# Patient Record
Sex: FEMALE | Race: WHITE | NOT HISPANIC OR LATINO | Employment: UNEMPLOYED | URBAN - METROPOLITAN AREA
[De-identification: names, ages, dates, MRNs, and addresses within clinical notes are randomized per-mention and may not be internally consistent; named-entity substitution may affect disease eponyms.]

---

## 2018-01-24 ENCOUNTER — CONVERSION ENCOUNTER (OUTPATIENT)
Dept: FAMILY MEDICINE CLINIC | Facility: CLINIC | Age: 38
End: 2018-01-24

## 2018-01-24 ENCOUNTER — OFFICE VISIT CONVERTED (OUTPATIENT)
Dept: FAMILY MEDICINE CLINIC | Facility: CLINIC | Age: 38
End: 2018-01-24
Attending: NURSE PRACTITIONER

## 2018-02-09 ENCOUNTER — OFFICE VISIT CONVERTED (OUTPATIENT)
Dept: FAMILY MEDICINE CLINIC | Facility: CLINIC | Age: 38
End: 2018-02-09
Attending: NURSE PRACTITIONER

## 2018-02-15 ENCOUNTER — OFFICE VISIT CONVERTED (OUTPATIENT)
Dept: UROLOGY | Facility: CLINIC | Age: 38
End: 2018-02-15
Attending: UROLOGY

## 2018-03-30 ENCOUNTER — OFFICE VISIT CONVERTED (OUTPATIENT)
Dept: UROLOGY | Facility: CLINIC | Age: 38
End: 2018-03-30
Attending: UROLOGY

## 2018-04-09 ENCOUNTER — OFFICE VISIT CONVERTED (OUTPATIENT)
Dept: SURGERY | Facility: CLINIC | Age: 38
End: 2018-04-09
Attending: PHYSICIAN ASSISTANT

## 2018-05-11 ENCOUNTER — OFFICE VISIT CONVERTED (OUTPATIENT)
Dept: FAMILY MEDICINE CLINIC | Facility: CLINIC | Age: 38
End: 2018-05-11
Attending: NURSE PRACTITIONER

## 2018-06-01 ENCOUNTER — CONVERSION ENCOUNTER (OUTPATIENT)
Dept: SURGERY | Facility: CLINIC | Age: 38
End: 2018-06-01

## 2018-06-01 ENCOUNTER — OFFICE VISIT CONVERTED (OUTPATIENT)
Dept: UROLOGY | Facility: CLINIC | Age: 38
End: 2018-06-01
Attending: UROLOGY

## 2018-07-19 ENCOUNTER — OFFICE VISIT CONVERTED (OUTPATIENT)
Dept: SURGERY | Facility: CLINIC | Age: 38
End: 2018-07-19
Attending: PHYSICIAN ASSISTANT

## 2018-10-04 ENCOUNTER — OFFICE VISIT CONVERTED (OUTPATIENT)
Dept: FAMILY MEDICINE CLINIC | Facility: CLINIC | Age: 38
End: 2018-10-04
Attending: NURSE PRACTITIONER

## 2018-12-18 ENCOUNTER — CONVERSION ENCOUNTER (OUTPATIENT)
Dept: FAMILY MEDICINE CLINIC | Facility: CLINIC | Age: 38
End: 2018-12-18

## 2018-12-18 ENCOUNTER — OFFICE VISIT CONVERTED (OUTPATIENT)
Dept: FAMILY MEDICINE CLINIC | Facility: CLINIC | Age: 38
End: 2018-12-18
Attending: NURSE PRACTITIONER

## 2019-01-08 ENCOUNTER — OFFICE VISIT CONVERTED (OUTPATIENT)
Dept: SURGERY | Facility: CLINIC | Age: 39
End: 2019-01-08
Attending: SURGERY

## 2019-01-23 ENCOUNTER — HOSPITAL ENCOUNTER (OUTPATIENT)
Dept: PREADMISSION TESTING | Facility: HOSPITAL | Age: 39
Discharge: HOME | End: 2019-01-23
Attending: SURGERY

## 2019-01-31 ENCOUNTER — HOSPITAL ENCOUNTER (OUTPATIENT)
Dept: PERIOP | Facility: HOSPITAL | Age: 39
Setting detail: HOSPITAL OUTPATIENT SURGERY
Discharge: HOME | End: 2019-01-31
Attending: SURGERY

## 2019-01-31 LAB — HCG SERPL-SCNC: NEGATIVE MMOL/L

## 2019-03-20 ENCOUNTER — CONVERSION ENCOUNTER (OUTPATIENT)
Dept: FAMILY MEDICINE CLINIC | Facility: CLINIC | Age: 39
End: 2019-03-20

## 2019-03-20 ENCOUNTER — OFFICE VISIT CONVERTED (OUTPATIENT)
Dept: FAMILY MEDICINE CLINIC | Facility: CLINIC | Age: 39
End: 2019-03-20
Attending: NURSE PRACTITIONER

## 2019-04-19 ENCOUNTER — OFFICE VISIT CONVERTED (OUTPATIENT)
Dept: FAMILY MEDICINE CLINIC | Facility: CLINIC | Age: 39
End: 2019-04-19
Attending: NURSE PRACTITIONER

## 2019-04-19 ENCOUNTER — CONVERSION ENCOUNTER (OUTPATIENT)
Dept: FAMILY MEDICINE CLINIC | Facility: CLINIC | Age: 39
End: 2019-04-19

## 2019-05-01 ENCOUNTER — HOSPITAL ENCOUNTER (OUTPATIENT)
Dept: SURGERY | Facility: CLINIC | Age: 39
Discharge: HOME | End: 2019-05-01
Attending: UROLOGY

## 2019-05-01 ENCOUNTER — OFFICE VISIT CONVERTED (OUTPATIENT)
Dept: UROLOGY | Facility: CLINIC | Age: 39
End: 2019-05-01
Attending: UROLOGY

## 2019-05-01 ENCOUNTER — CONVERSION ENCOUNTER (OUTPATIENT)
Dept: SURGERY | Facility: CLINIC | Age: 39
End: 2019-05-01

## 2019-05-03 LAB — BACTERIA UR CULT: NORMAL

## 2019-05-07 ENCOUNTER — HOSPITAL ENCOUNTER (OUTPATIENT)
Dept: PERIOP | Facility: HOSPITAL | Age: 39
Setting detail: HOSPITAL OUTPATIENT SURGERY
Discharge: HOME | End: 2019-05-07
Attending: UROLOGY

## 2019-06-27 ENCOUNTER — HOSPITAL ENCOUNTER (OUTPATIENT)
Dept: ULTRASOUND IMAGING | Facility: HOSPITAL | Age: 39
Discharge: HOME | End: 2019-06-27
Attending: UROLOGY

## 2019-07-31 ENCOUNTER — OFFICE VISIT CONVERTED (OUTPATIENT)
Dept: UROLOGY | Facility: CLINIC | Age: 39
End: 2019-07-31
Attending: UROLOGY

## 2019-08-01 ENCOUNTER — HOSPITAL ENCOUNTER (OUTPATIENT)
Dept: PERIOP | Facility: HOSPITAL | Age: 39
Setting detail: HOSPITAL OUTPATIENT SURGERY
Discharge: HOME | End: 2019-08-01
Attending: UROLOGY

## 2019-08-01 LAB — HCG UR QL: NEGATIVE

## 2019-08-12 ENCOUNTER — OFFICE VISIT CONVERTED (OUTPATIENT)
Dept: FAMILY MEDICINE CLINIC | Facility: CLINIC | Age: 39
End: 2019-08-12
Attending: NURSE PRACTITIONER

## 2019-09-12 ENCOUNTER — CONVERSION ENCOUNTER (OUTPATIENT)
Dept: FAMILY MEDICINE CLINIC | Facility: CLINIC | Age: 39
End: 2019-09-12

## 2019-09-12 ENCOUNTER — OFFICE VISIT CONVERTED (OUTPATIENT)
Dept: FAMILY MEDICINE CLINIC | Facility: CLINIC | Age: 39
End: 2019-09-12
Attending: NURSE PRACTITIONER

## 2019-12-03 ENCOUNTER — OFFICE VISIT CONVERTED (OUTPATIENT)
Dept: FAMILY MEDICINE CLINIC | Facility: CLINIC | Age: 39
End: 2019-12-03
Attending: NURSE PRACTITIONER

## 2020-02-12 ENCOUNTER — CONVERSION ENCOUNTER (OUTPATIENT)
Dept: FAMILY MEDICINE CLINIC | Facility: CLINIC | Age: 40
End: 2020-02-12

## 2020-02-12 ENCOUNTER — OFFICE VISIT CONVERTED (OUTPATIENT)
Dept: FAMILY MEDICINE CLINIC | Facility: CLINIC | Age: 40
End: 2020-02-12
Attending: NURSE PRACTITIONER

## 2021-02-15 ENCOUNTER — OFFICE VISIT CONVERTED (OUTPATIENT)
Dept: UROLOGY | Facility: CLINIC | Age: 41
End: 2021-02-15
Attending: UROLOGY

## 2021-02-15 ENCOUNTER — CONVERSION ENCOUNTER (OUTPATIENT)
Dept: SURGERY | Facility: CLINIC | Age: 41
End: 2021-02-15

## 2021-02-15 LAB
BILIRUB UR QL STRIP: NEGATIVE
COLOR UR: YELLOW
CONV CLARITY OF URINE: CLEAR
CONV PROTEIN IN URINE BY AUTOMATED TEST STRIP: NEGATIVE
CONV UROBILINOGEN IN URINE BY AUTOMATED TEST STRIP: NORMAL
GLUCOSE UR QL: NEGATIVE
HGB UR QL STRIP: NEGATIVE
KETONES UR QL STRIP: NEGATIVE
LEUKOCYTE ESTERASE UR QL STRIP: NORMAL
NITRITE UR QL STRIP: NEGATIVE
PH UR STRIP.AUTO: 6.5 [PH]
SP GR UR: 1.02

## 2021-05-14 VITALS — BODY MASS INDEX: 29.66 KG/M2 | RESPIRATION RATE: 12 BRPM | WEIGHT: 178 LBS | HEIGHT: 65 IN

## 2021-05-14 NOTE — PROGRESS NOTES
Progress Note      Patient Name: Anni Pickard   Patient ID: [id number]   Sex: Female   YOB: 1980    Primary Care Provider: Valentin ESTES   Referring Provider: Valentin ESTES    Visit Date: February 15, 2021    Provider: Amador Hill MD   Location: [facility]   Location Address: [address] [place] [address]  [id number]   Location Phone: [phone]          Chief Complaint  · pt here for urologic issues      History Of Present Illness     40-year-old  female who follows up today for nephrolithiasis.     2/12/2021 CT abdomen/pelvis without - 1 punctate stone on the right and 1 punctate stone on the left.  Ureteral stones.    Currently is having left-sided flank pain.  No fevers or chills.  Pain is a 7/10. No n/v.  Pain is not improving.  This is been bothering her for about a month.  Pain does not change with movement.    20 surgeries for kidney stones.  She is not on any medication for kidney stone prevention at this time.    No cardiopulmonary history.  Patient has not smoked for 2-month.  Patient does not use blood thinner.     Patient has been on potassium citrate in the past for stones, not on this currently.  Patient has also been on HCTZ 12.5 mg in the past her PCP, she is been out of this for several months.     Previous    5/7/2019 left ureteroscopy with basket extraction of stone -all stones removed    4/28/2019 CT abdomen/pelvis withoutno obstructing stones.  Patient has about 8 stones in her left side several letter 3 or 4 mm.  Patient also has about 3 to 4 stones on her right side these are also about 3 to 4 mm.    5/1/18 right ureteroscopy with laser - all stones removed in the right side.  4/5/18 left ureteroscopy with laserall stones removed from the left side.  Patient removed both stents at home.    3/19/18 CT abdomen/pelvis without 3 nonobstructing stones in the left.  8 mm stone in the lower pole left kidney.  Other 2 stones are small.   "4 nonobstructing stones on the right.  Largest 5 mm.     6/16/16  CT abdomen/pelvis without contrast - done at [facility], 3 stones on the left all about 4-5 mm, 3 stones on the right all about 2-4 mm, no ureteral stones and no hydronephrosis    No urologic family history           Past Medical History  Alcoholism; Anxiety and depression; Blindness of right eye; Chemotherapy management, encounter for; Essential hypertension; GERD; Hypertension; Insomnia; Kidney calculi; Kidney Disease; Migraine headache; Reflux; Seizures         Past Surgical History  Cystoscopy and ureteroscopy with laser lithotripsy; D&C (dilatation and curettage); Eye removal; Lipoma Removal; Ureter stent placement; Ureteroscopic Stone Removal         Medication List  amitriptyline 50 mg oral tablet; amlodipine-benazepril 5-20 mg oral capsule; Cymbalta 60 mg oral capsule,delayed release(DR/EC); Estroven 155 mg oral capsule; hydrochlorothiazide 12.5 mg oral tablet; Lotrel 5-20 mg oral capsule; Requip 0.25 mg oral tablet; Seroquel 400 mg oral tablet; trazodone 100 mg oral tablet; Xanax 0.5 mg oral tablet; zolpidem 6.25 mg oral tablet,ext release multiphase         Allergy List  NO KNOWN DRUG ALLERGIES         Family Medical History  Stroke; Heart Disease; Cancer, Unspecified; Diabetes         Social History  Tobacco (Current every day)         Review of Systems  · Constitutional  o Denies  o : chills, fever  · Gastrointestinal  o Denies  o : nausea, vomiting      Vitals  Date Time BP Position Site L\R Cuff Size HR RR TEMP (F) WT  HT  BMI kg/m2 BSA m2 O2 Sat FR L/min FiO2        02/15/2021 10:24 AM       12  178lbs 0oz 5'  5\" 29.62 1.92             Physical Examination  · Constitutional  o Appearance  o : Well-appearing, well-developed, in no acute distress  · Respiratory  o Respiratory Effort  o : Unlabored breathing  · Neurologic  o Mental Status Examination  o :   § Orientation  § : Grossly oriented to person, place and time, judgment and insight " intact, normal mood and affect          Results  · In-Office Procedures  o Lab procedure  § Automated Dipstick Urinalysis (Surg Spec) WITHOUT Micro [facility] (83893)   § Color Ur: Yellow   § Clarity Ur: Clear   § Glucose Ur Ql Strip: Negative   § Bilirub Ur Ql Strip: Negative   § Ketones Ur Ql Strip: Negative   § Sp Gr Ur Qn: 1.020   § Hgb Ur Ql Strip: Negative   § pH Ur-LsCnc: 6.5   § Prot Ur Ql Strip: Negative   § Urobilinogen Ur Strip-mCnc: 0.2 E.U./dL   § Nitrite Ur Ql Strip: Negative   § WBC Est Ur Ql Strip: Trace       Assessment  · Nephrolithiasis     592.0/N20.0  · Flank pain     789.09/R10.9      Plan  · Orders  o Litholink (Urine Collection) (LITHO) - 592.0/N20.0, 789.09/R10.9 - 02/15/2021  o BMP Non-fasting [facility] (26674) - 592.0/N20.0, 789.09/R10.9 - 02/15/2021  o Uric Acid Serum [facility] (84961) - 592.0/N20.0, 789.09/R10.9 - 02/15/2021  · Medications  o Medications have been Reconciled  o Transition of Care or Provider Policy  · Instructions  o Electronically Identified Patient Education Materials Provided Electronically       CT images and read reviewed today and discussed with the patient.    Having some pain in her left side, I did discuss her CT does not show any stones of any size and no obstructing stones.  No surgical intervention needed.  Patient voiced understanding    Patient will start back on HCTZ 12.5 through PCP and get her 24 urine after     BMP and serum uric acid     interested in metabolic stone work-up, we discussed this today I will have her come back in 3 months with tests                 Electronically Signed by: Amador Hill MD -Author on February 15, 2021 01:48:13 PM

## 2021-05-15 VITALS
HEART RATE: 107 BPM | RESPIRATION RATE: 16 BRPM | HEIGHT: 65 IN | BODY MASS INDEX: 28.82 KG/M2 | DIASTOLIC BLOOD PRESSURE: 68 MMHG | OXYGEN SATURATION: 96 % | TEMPERATURE: 98.2 F | SYSTOLIC BLOOD PRESSURE: 138 MMHG | WEIGHT: 173 LBS

## 2021-05-15 VITALS
WEIGHT: 152 LBS | DIASTOLIC BLOOD PRESSURE: 110 MMHG | HEIGHT: 65 IN | OXYGEN SATURATION: 97 % | RESPIRATION RATE: 16 BRPM | HEART RATE: 113 BPM | BODY MASS INDEX: 25.33 KG/M2 | TEMPERATURE: 98.4 F | SYSTOLIC BLOOD PRESSURE: 113 MMHG

## 2021-05-15 VITALS
WEIGHT: 192 LBS | HEIGHT: 65 IN | RESPIRATION RATE: 16 BRPM | OXYGEN SATURATION: 96 % | BODY MASS INDEX: 31.99 KG/M2 | TEMPERATURE: 98 F | SYSTOLIC BLOOD PRESSURE: 116 MMHG | DIASTOLIC BLOOD PRESSURE: 72 MMHG | HEART RATE: 114 BPM

## 2021-05-15 VITALS
HEART RATE: 80 BPM | OXYGEN SATURATION: 98 % | SYSTOLIC BLOOD PRESSURE: 125 MMHG | BODY MASS INDEX: 28.86 KG/M2 | RESPIRATION RATE: 28 BRPM | WEIGHT: 173.25 LBS | DIASTOLIC BLOOD PRESSURE: 73 MMHG | TEMPERATURE: 97.8 F | HEIGHT: 65 IN

## 2021-05-15 VITALS
RESPIRATION RATE: 23 BRPM | BODY MASS INDEX: 31.4 KG/M2 | DIASTOLIC BLOOD PRESSURE: 69 MMHG | SYSTOLIC BLOOD PRESSURE: 106 MMHG | WEIGHT: 188.5 LBS | HEIGHT: 65 IN | HEART RATE: 90 BPM | TEMPERATURE: 97.7 F

## 2021-05-15 VITALS
BODY MASS INDEX: 30.66 KG/M2 | TEMPERATURE: 99.6 F | OXYGEN SATURATION: 97 % | WEIGHT: 184 LBS | HEIGHT: 65 IN | HEART RATE: 86 BPM | DIASTOLIC BLOOD PRESSURE: 68 MMHG | SYSTOLIC BLOOD PRESSURE: 118 MMHG | RESPIRATION RATE: 18 BRPM

## 2021-05-15 VITALS — WEIGHT: 175.19 LBS | HEIGHT: 65 IN | RESPIRATION RATE: 16 BRPM | BODY MASS INDEX: 29.19 KG/M2

## 2021-05-15 VITALS — BODY MASS INDEX: 29.22 KG/M2 | WEIGHT: 175.37 LBS | RESPIRATION RATE: 16 BRPM | HEIGHT: 65 IN

## 2021-05-16 VITALS
OXYGEN SATURATION: 98 % | TEMPERATURE: 97.5 F | DIASTOLIC BLOOD PRESSURE: 83 MMHG | SYSTOLIC BLOOD PRESSURE: 117 MMHG | WEIGHT: 180 LBS | HEART RATE: 79 BPM | HEIGHT: 65 IN | BODY MASS INDEX: 29.99 KG/M2

## 2021-05-16 VITALS
HEART RATE: 89 BPM | RESPIRATION RATE: 16 BRPM | OXYGEN SATURATION: 100 % | SYSTOLIC BLOOD PRESSURE: 141 MMHG | TEMPERATURE: 97.5 F | HEIGHT: 65 IN | DIASTOLIC BLOOD PRESSURE: 97 MMHG | WEIGHT: 186 LBS | BODY MASS INDEX: 30.99 KG/M2

## 2021-05-16 VITALS — WEIGHT: 184 LBS | RESPIRATION RATE: 16 BRPM | HEIGHT: 65 IN | BODY MASS INDEX: 30.66 KG/M2

## 2021-05-16 VITALS
HEIGHT: 65 IN | OXYGEN SATURATION: 98 % | HEART RATE: 107 BPM | SYSTOLIC BLOOD PRESSURE: 140 MMHG | BODY MASS INDEX: 29.66 KG/M2 | RESPIRATION RATE: 16 BRPM | WEIGHT: 178 LBS | TEMPERATURE: 97.1 F | DIASTOLIC BLOOD PRESSURE: 110 MMHG

## 2021-05-16 VITALS
TEMPERATURE: 97.1 F | RESPIRATION RATE: 16 BRPM | DIASTOLIC BLOOD PRESSURE: 93 MMHG | HEART RATE: 90 BPM | HEIGHT: 65 IN | BODY MASS INDEX: 30.66 KG/M2 | OXYGEN SATURATION: 97 % | WEIGHT: 184 LBS | SYSTOLIC BLOOD PRESSURE: 132 MMHG

## 2021-05-16 VITALS
HEIGHT: 65 IN | SYSTOLIC BLOOD PRESSURE: 136 MMHG | BODY MASS INDEX: 30.99 KG/M2 | DIASTOLIC BLOOD PRESSURE: 86 MMHG | WEIGHT: 186 LBS

## 2021-05-16 VITALS — BODY MASS INDEX: 29.57 KG/M2 | HEIGHT: 65 IN | WEIGHT: 177.5 LBS | RESPIRATION RATE: 12 BRPM

## 2021-05-16 VITALS — BODY MASS INDEX: 28.82 KG/M2 | WEIGHT: 173 LBS | RESPIRATION RATE: 18 BRPM | HEIGHT: 65 IN

## 2021-05-16 VITALS — WEIGHT: 176 LBS | HEIGHT: 65 IN | BODY MASS INDEX: 29.32 KG/M2 | RESPIRATION RATE: 14 BRPM

## 2021-05-16 VITALS — RESPIRATION RATE: 16 BRPM | HEIGHT: 65 IN | BODY MASS INDEX: 29.37 KG/M2 | WEIGHT: 176.31 LBS

## 2021-05-16 VITALS
HEART RATE: 98 BPM | RESPIRATION RATE: 16 BRPM | WEIGHT: 173 LBS | TEMPERATURE: 98.6 F | HEIGHT: 65 IN | BODY MASS INDEX: 28.82 KG/M2 | SYSTOLIC BLOOD PRESSURE: 121 MMHG | DIASTOLIC BLOOD PRESSURE: 81 MMHG | OXYGEN SATURATION: 95 %

## 2021-08-26 ENCOUNTER — TELEPHONE (OUTPATIENT)
Dept: FAMILY MEDICINE CLINIC | Facility: CLINIC | Age: 41
End: 2021-08-26

## 2021-08-26 RX ORDER — AMLODIPINE BESYLATE AND BENAZEPRIL HYDROCHLORIDE 5; 20 MG/1; MG/1
CAPSULE ORAL
COMMUNITY
Start: 2021-06-04 | End: 2021-08-26 | Stop reason: SDUPTHER

## 2021-08-26 RX ORDER — AMLODIPINE BESYLATE AND BENAZEPRIL HYDROCHLORIDE 5; 20 MG/1; MG/1
1 CAPSULE ORAL DAILY
Qty: 90 CAPSULE | Refills: 1 | Status: SHIPPED | OUTPATIENT
Start: 2021-08-26 | End: 2022-07-12 | Stop reason: SDUPTHER

## 2021-08-26 NOTE — TELEPHONE ENCOUNTER
Caller: PHILIP ZAVALA    Relationship: Spouse    Best call back number:[phone]    Medication needed:   LOTREL 50 MG      SAID BLOOD PRESSURE MEDICATION    When do you need the refill by: ASAP    Does the patient have less than a 3 day supply:  [x] Yes  [] No    What is the patient's preferred pharmacy:      [facility] [address] [place] [address] - [address] [facility] [phone] [facility] [phone]   [phone]

## 2021-09-13 DIAGNOSIS — G25.81 RLS (RESTLESS LEGS SYNDROME): Primary | ICD-10-CM

## 2021-09-13 RX ORDER — ROPINIROLE 0.25 MG/1
0.25 TABLET, FILM COATED ORAL NIGHTLY
Qty: 30 TABLET | Refills: 2 | Status: SHIPPED | OUTPATIENT
Start: 2021-09-13 | End: 2022-02-10 | Stop reason: SDUPTHER

## 2022-02-10 DIAGNOSIS — G25.81 RLS (RESTLESS LEGS SYNDROME): ICD-10-CM

## 2022-02-10 RX ORDER — ROPINIROLE 0.25 MG/1
0.25 TABLET, FILM COATED ORAL NIGHTLY
Qty: 30 TABLET | Refills: 2 | Status: SHIPPED | OUTPATIENT
Start: 2022-02-10 | End: 2022-09-26 | Stop reason: SDUPTHER

## 2022-02-22 ENCOUNTER — TELEPHONE (OUTPATIENT)
Dept: UROLOGY | Facility: CLINIC | Age: 42
End: 2022-02-22

## 2022-02-22 NOTE — TELEPHONE ENCOUNTER
"PATIENT CALLED BACK. SAID SHE RECEIVED A PHONE CALL FROM US. I ASKED HER IF SHE WAS REFERRED TO US FROM HER PCP. SHE SAID NO. SO I LOOKED HER UP AND SEEN WHERE [employer] WAS TRYING TO REACH PATIENT. I READ PATIENT THE MESSAGE AND WHEN I GOT TO TO WHERE IT WAS ASKING IF TESS SENT HER A 24 HOUR URINE KIT SHE SAID \"NO AND I'M GOING TO STOP YOU RIGHT THERE. I'M DONE SEEING YOU ALL.\" AND HUNG UP ON ME.   "

## 2022-02-22 NOTE — TELEPHONE ENCOUNTER
Left message for patient to call back.  Want to know if she wants to follow up with Dr Hill.  In February of 2021 he ordered labs for a follow up.  We have tried to reach patient to schedule follow up.  I do not want to put the orders in Epic unless patient schedules a follow up.  Stephanie should've already sent her a 24 hour urine kit.  If appt made will need Uric Acid serum, 24 hour urine, and BMP orders put in.  Original orders in [facility] dated 2-15-21.

## 2022-02-23 PROBLEM — K21.9 ESOPHAGEAL REFLUX: Status: ACTIVE | Noted: 2022-02-23

## 2022-02-23 PROBLEM — F41.9 ANXIETY AND DEPRESSION: Status: ACTIVE | Noted: 2022-02-23

## 2022-02-23 PROBLEM — G47.00 INSOMNIA: Status: ACTIVE | Noted: 2022-02-23

## 2022-02-23 PROBLEM — F10.20 ALCOHOLISM: Status: ACTIVE | Noted: 2022-02-23

## 2022-02-23 PROBLEM — H54.40 BLINDNESS OF RIGHT EYE: Status: ACTIVE | Noted: 2022-02-23

## 2022-02-23 PROBLEM — I10 ESSENTIAL HYPERTENSION: Status: ACTIVE | Noted: 2018-01-24

## 2022-02-23 PROBLEM — G43.909 MIGRAINE HEADACHE: Status: ACTIVE | Noted: 2022-02-23

## 2022-02-23 PROBLEM — Z51.11 CHEMOTHERAPY MANAGEMENT, ENCOUNTER FOR: Status: ACTIVE | Noted: 2022-02-23

## 2022-02-23 PROBLEM — N28.9 KIDNEY DISEASE: Status: ACTIVE | Noted: 2022-02-23

## 2022-02-23 PROBLEM — F32.A ANXIETY AND DEPRESSION: Status: ACTIVE | Noted: 2022-02-23

## 2022-02-23 PROBLEM — R56.9 SEIZURES (HCC): Status: ACTIVE | Noted: 2022-02-23

## 2022-03-31 RX ORDER — AMLODIPINE BESYLATE AND BENAZEPRIL HYDROCHLORIDE 5; 20 MG/1; MG/1
1 CAPSULE ORAL DAILY
Qty: 90 CAPSULE | Refills: 1 | Status: CANCELLED | OUTPATIENT
Start: 2022-03-31

## 2022-03-31 NOTE — TELEPHONE ENCOUNTER
sent a [employer] message stating the patient needs amLODIPine-benazepril (LOTREL 5-20) 5-20 MG per capsule  to be refilled. Patient does not have a verbal on file. lvm on the patients phone stating she would need an appointment for a refill due to her not being seen since 2/12/2020.    Please advise medication   Last visit:2/12/2020  Next visit:na

## 2022-05-03 DIAGNOSIS — G25.81 RLS (RESTLESS LEGS SYNDROME): ICD-10-CM

## 2022-05-03 RX ORDER — ROPINIROLE 0.25 MG/1
TABLET, FILM COATED ORAL
Qty: 30 TABLET | Refills: 2 | OUTPATIENT
Start: 2022-05-03

## 2022-07-11 RX ORDER — AMLODIPINE BESYLATE AND BENAZEPRIL HYDROCHLORIDE 5; 20 MG/1; MG/1
1 CAPSULE ORAL DAILY
Qty: 90 CAPSULE | Refills: 1 | OUTPATIENT
Start: 2022-07-11

## 2022-07-11 NOTE — TELEPHONE ENCOUNTER
Caller: Bishop April Dawn    Relationship: Self    Best call back number: [phone]    Requested Prescriptions:   Requested Prescriptions     Pending Prescriptions Disp Refills   • amLODIPine-benazepril (LOTREL 5-20) 5-20 MG per capsule 90 capsule 1     Sig: Take 1 capsule by mouth Daily.        Pharmacy where request should be sent: [id number] [place] [facility] [address] [facility] [phone] [facility] [phone]      Additional details provided by patient: HAS BEEN OUT FOR ABOUT 2 WEEKS    Does the patient have less than a 3 day supply:  [x] Yes  [] No    Meghan ALMAGUER Rep   07/11/22 08:08 EDT

## 2022-07-12 RX ORDER — AMLODIPINE BESYLATE AND BENAZEPRIL HYDROCHLORIDE 5; 20 MG/1; MG/1
1 CAPSULE ORAL DAILY
Qty: 90 CAPSULE | Refills: 1 | Status: SHIPPED | OUTPATIENT
Start: 2022-07-12 | End: 2022-07-13 | Stop reason: SDUPTHER

## 2022-07-12 NOTE — TELEPHONE ENCOUNTER
Caller: PHILIP ZAVALA    Relationship: Emergency Contact    Best call back number: [phone]     What is the medical concern/diagnosis: PROSTHETIC EYE IS BROKEN     What specialty or service is being requested: PROSTHETIC EYE     What is the provider, practice or medical service name: [facility]     What is the office location: [address] [place] [address]       What is the office phone number: [phone]    Any additional details: THE CODES TO USE FOR THE TRI CARE ARE    - 1 UNIT  - 1 UNIT

## 2022-07-12 NOTE — TELEPHONE ENCOUNTER
SHE WANT TO KNOW WHEN HER AMLODIPINE WILL BE SENT AND SHE NEEDS HER PROSTHETIC EYE REPLACED IT IS BROKEN

## 2022-07-13 RX ORDER — AMLODIPINE BESYLATE AND BENAZEPRIL HYDROCHLORIDE 5; 20 MG/1; MG/1
1 CAPSULE ORAL DAILY
Qty: 90 CAPSULE | Refills: 1 | Status: SHIPPED | OUTPATIENT
Start: 2022-07-13 | End: 2022-09-26 | Stop reason: SDUPTHER

## 2022-08-11 ENCOUNTER — TELEPHONE (OUTPATIENT)
Dept: FAMILY MEDICINE CLINIC | Facility: CLINIC | Age: 42
End: 2022-08-11

## 2022-08-23 ENCOUNTER — TELEPHONE (OUTPATIENT)
Dept: FAMILY MEDICINE CLINIC | Facility: CLINIC | Age: 42
End: 2022-08-23

## 2022-08-23 NOTE — TELEPHONE ENCOUNTER
lvmtcb   Patient cancelled their appointment for 8/23. Would patient like to reschedule.     This appointment may need to be an in office appointment. Patient has not been seen in office since 2/12/2020

## 2022-09-23 RX ORDER — OLANZAPINE 5 MG/1
5 TABLET ORAL
COMMUNITY
Start: 2022-08-09

## 2022-09-26 ENCOUNTER — TELEMEDICINE (OUTPATIENT)
Dept: FAMILY MEDICINE CLINIC | Facility: CLINIC | Age: 42
End: 2022-09-26

## 2022-09-26 DIAGNOSIS — R10.9 FLANK PAIN: Primary | ICD-10-CM

## 2022-09-26 DIAGNOSIS — I10 ESSENTIAL HYPERTENSION: ICD-10-CM

## 2022-09-26 DIAGNOSIS — Z97.0 PROSTHETIC EYE GLOBE: ICD-10-CM

## 2022-09-26 DIAGNOSIS — G25.81 RLS (RESTLESS LEGS SYNDROME): ICD-10-CM

## 2022-09-26 PROCEDURE — 99214 OFFICE O/P EST MOD 30 MIN: CPT | Performed by: NURSE PRACTITIONER

## 2022-09-26 RX ORDER — AMITRIPTYLINE HYDROCHLORIDE 50 MG/1
50 TABLET, FILM COATED ORAL NIGHTLY
Qty: 90 TABLET | Refills: 1 | Status: SHIPPED | OUTPATIENT
Start: 2022-09-26 | End: 2023-03-15

## 2022-09-26 RX ORDER — HYDROCHLOROTHIAZIDE 12.5 MG/1
12.5 TABLET ORAL DAILY
COMMUNITY
End: 2022-09-26 | Stop reason: SDUPTHER

## 2022-09-26 RX ORDER — AMLODIPINE BESYLATE AND BENAZEPRIL HYDROCHLORIDE 5; 20 MG/1; MG/1
1 CAPSULE ORAL DAILY
Qty: 90 CAPSULE | Refills: 0 | Status: SHIPPED | OUTPATIENT
Start: 2022-09-26 | End: 2022-12-02 | Stop reason: ALTCHOICE

## 2022-09-26 RX ORDER — ROPINIROLE 0.25 MG/1
0.25 TABLET, FILM COATED ORAL NIGHTLY
Qty: 90 TABLET | Refills: 0 | Status: SHIPPED | OUTPATIENT
Start: 2022-09-26 | End: 2022-12-20

## 2022-09-26 RX ORDER — AMITRIPTYLINE HYDROCHLORIDE 50 MG/1
50 TABLET, FILM COATED ORAL NIGHTLY
COMMUNITY
End: 2022-09-26 | Stop reason: SDUPTHER

## 2022-09-26 RX ORDER — HYDROCHLOROTHIAZIDE 12.5 MG/1
12.5 TABLET ORAL DAILY
Qty: 90 TABLET | Refills: 1 | Status: SHIPPED | OUTPATIENT
Start: 2022-09-26 | End: 2023-03-20

## 2022-09-26 NOTE — TELEPHONE ENCOUNTER
----- Message from Anni Pickard sent at 9/26/2022  9:30 AM EDT -----  Regarding: Amytriptaline  50 mg  All prescriptions have been received at the pharmacy minus the 50 mg Amytriptaline . Is there a reason?

## 2022-09-26 NOTE — PROGRESS NOTES
Chief Complaint  Needing referral for prosthetic eye, medication refills    Subjective         April Dena Pickard presents to [facility]  Telehealth visit completed with the patient.  Consent was obtained due to the telehealth visit.  She states that she is needing referral for her prosthetic eye.  Patient has had a prosthetic eye since 1981.  Her current prosthesis was obtained 6 years ago.  She is recently misplaced this and does not have one currently.  Patient also complains of left flank pain.  He states that this is in the area where she had a lipoma removed.  She denies any urinary symptoms.  I did discuss needing to obtain lab work.  Patient adamantly refuses to have this completed.  I did discuss the importance of evaluating kidney and liver function due to her being on medications.  Patient states that she will not have lab work completed.  I did explain the risk associated with this.  Patient's  was present during this conversation.       Objective     There were no vitals filed for this visit.   There is no height or weight on file to calculate BMI.            Physical Exam  Pulmonary:      Effort: Pulmonary effort is normal.   Neurological:      General: No focal deficit present.      Mental Status: She is alert.   Psychiatric:         Mood and Affect: Mood normal.         Behavior: Behavior normal.          Result Review :   The following data was reviewed by: FRANKLYN Villalba on 09/26/2022.      Procedures    Assessment and Plan   Diagnoses and all orders for this visit:    1. Flank pain (Primary)  -     CT Abdomen Pelvis Without Contrast; Future    2. Prosthetic eye globe  -     Ambulatory Referral to Ophthalmology    3. RLS (restless legs syndrome)  -     rOPINIRole (Requip) 0.25 MG tablet; Take 1 tablet by mouth Every Night. Take 1 hour before bedtime.  Dispense: 90 tablet; Refill: 0    4. Essential hypertension  -     amLODIPine-benazepril (LOTREL 5-20)  5-20 MG per capsule; Take 1 capsule by mouth Daily.  Dispense: 90 capsule; Refill: 0  -     hydroCHLOROthiazide (HYDRODIURIL) 12.5 MG tablet; Take 1 tablet by mouth Daily.  Dispense: 90 tablet; Refill: 1          Follow Up   Return in about 6 months (around 3/26/2023) for Recheck.  Patient was given instructions and counseling regarding her condition or for health maintenance advice. Please see specific information pulled into the AVS if appropriate.

## 2022-10-13 ENCOUNTER — APPOINTMENT (OUTPATIENT)
Dept: CT IMAGING | Facility: HOSPITAL | Age: 42
End: 2022-10-13

## 2022-11-03 ENCOUNTER — HOSPITAL ENCOUNTER (OUTPATIENT)
Dept: CT IMAGING | Facility: HOSPITAL | Age: 42
Discharge: HOME | End: 2022-11-03
Admitting: NURSE PRACTITIONER

## 2022-11-03 DIAGNOSIS — R10.9 FLANK PAIN: ICD-10-CM

## 2022-11-03 PROCEDURE — 74176 CT ABD & PELVIS W/O CONTRAST: CPT

## 2022-12-02 DIAGNOSIS — I10 ESSENTIAL HYPERTENSION: ICD-10-CM

## 2022-12-02 RX ORDER — PANTOPRAZOLE SODIUM 40 MG/1
40 TABLET, DELAYED RELEASE ORAL DAILY
Qty: 90 TABLET | Refills: 1 | Status: SHIPPED | OUTPATIENT
Start: 2022-12-02 | End: 2023-02-27

## 2022-12-02 RX ORDER — AMLODIPINE BESYLATE AND BENAZEPRIL HYDROCHLORIDE 10; 20 MG/1; MG/1
1 CAPSULE ORAL DAILY
Qty: 90 CAPSULE | Refills: 1 | Status: SHIPPED | OUTPATIENT
Start: 2022-12-02 | End: 2023-02-24 | Stop reason: SDUPTHER

## 2022-12-16 DIAGNOSIS — G43.909 MIGRAINE WITHOUT STATUS MIGRAINOSUS, NOT INTRACTABLE, UNSPECIFIED MIGRAINE TYPE: Primary | ICD-10-CM

## 2022-12-20 DIAGNOSIS — I10 ESSENTIAL HYPERTENSION: ICD-10-CM

## 2022-12-20 DIAGNOSIS — G25.81 RLS (RESTLESS LEGS SYNDROME): ICD-10-CM

## 2022-12-20 RX ORDER — AMLODIPINE BESYLATE AND BENAZEPRIL HYDROCHLORIDE 5; 20 MG/1; MG/1
1 CAPSULE ORAL DAILY
Qty: 90 CAPSULE | Refills: 0 | OUTPATIENT
Start: 2022-12-20

## 2022-12-20 RX ORDER — ROPINIROLE 0.25 MG/1
TABLET, FILM COATED ORAL
Qty: 90 TABLET | Refills: 0 | Status: SHIPPED | OUTPATIENT
Start: 2022-12-20 | End: 2023-03-20

## 2022-12-27 DIAGNOSIS — Z13.220 SCREENING FOR LIPID DISORDERS: ICD-10-CM

## 2022-12-27 DIAGNOSIS — I10 ESSENTIAL HYPERTENSION: Primary | ICD-10-CM

## 2022-12-27 DIAGNOSIS — N28.9 KIDNEY DISEASE: ICD-10-CM

## 2022-12-27 DIAGNOSIS — N92.6 MENSES, IRREGULAR: ICD-10-CM

## 2023-02-13 ENCOUNTER — TELEPHONE (OUTPATIENT)
Dept: FAMILY MEDICINE CLINIC | Facility: CLINIC | Age: 43
End: 2023-02-13
Payer: OTHER GOVERNMENT

## 2023-02-13 NOTE — TELEPHONE ENCOUNTER
Caller: Anni    Relationship: Self    Best call back number: [phone]    Who are you requesting to speak with (clinical staff, provider,  specific staff member): CLINICAL    What was the call regarding: PATIENT STATES HER  HAS SIGNED HER UP FOR [employer] AND HAS BEEN SENDING MESSAGES AS HER. PATIENT STATES SHE DIDN'T EVEN KNOW THAT [employer] EXISTED.    Do you require a callback: IF NECESSARY

## 2023-02-13 NOTE — TELEPHONE ENCOUNTER
Called patient.       Patient stated she was not aware that  was sending messages as her in her personal mychart pretending to be her. Patient wants husbands phone number off her chart and he name off the emergency contact.     Advised patient if we receive a mychart message from her that we will call her on the phone to discuss.     Patient has upcoming appt and will fix her chart

## 2023-02-24 ENCOUNTER — TELEPHONE (OUTPATIENT)
Dept: FAMILY MEDICINE CLINIC | Facility: CLINIC | Age: 43
End: 2023-02-24

## 2023-02-24 RX ORDER — AMLODIPINE BESYLATE AND BENAZEPRIL HYDROCHLORIDE 10; 20 MG/1; MG/1
1 CAPSULE ORAL DAILY
Qty: 30 CAPSULE | Refills: 0 | Status: SHIPPED | OUTPATIENT
Start: 2023-02-24

## 2023-02-24 NOTE — TELEPHONE ENCOUNTER
Per [employer] message in spouse account.    Anni Hanson had an appointment with you this morning at 7:30 and decided she couldn’t keep it due to issues with diarrhea, so she couldn’t leave the house. Somehow, she deactivated her [employer] account and now states that she’s never had one.  Additionally, I tried to inform the office of this, but they stated that they couldn’t speak with me about it because I wasn’t on some form that they needed.  She has gotten her medications messed up, or lost them, but she is completely out of her amlodipine for blood pressure and it’s about a week before I can refill it. Could you send some to [facility] for her?     Respectfully,  Maynor Pickard         is no longer on verbal consent, so I cannot inform him of anything, can you refill her medication for 30 days?

## 2023-02-27 RX ORDER — PANTOPRAZOLE SODIUM 40 MG/1
40 TABLET, DELAYED RELEASE ORAL DAILY
Qty: 90 TABLET | Refills: 0 | Status: SHIPPED | OUTPATIENT
Start: 2023-02-27

## 2023-03-15 RX ORDER — AMITRIPTYLINE HYDROCHLORIDE 50 MG/1
50 TABLET, FILM COATED ORAL NIGHTLY
Qty: 90 TABLET | Refills: 1 | Status: SHIPPED | OUTPATIENT
Start: 2023-03-15

## 2023-03-18 DIAGNOSIS — G25.81 RLS (RESTLESS LEGS SYNDROME): ICD-10-CM

## 2023-03-18 DIAGNOSIS — I10 ESSENTIAL HYPERTENSION: ICD-10-CM

## 2023-03-20 RX ORDER — ROPINIROLE 0.25 MG/1
TABLET, FILM COATED ORAL
Qty: 90 TABLET | Refills: 0 | Status: SHIPPED | OUTPATIENT
Start: 2023-03-20

## 2023-03-20 RX ORDER — HYDROCHLOROTHIAZIDE 12.5 MG/1
12.5 TABLET ORAL DAILY
Qty: 90 TABLET | Refills: 0 | Status: SHIPPED | OUTPATIENT
Start: 2023-03-20

## 2023-05-24 RX ORDER — AMLODIPINE BESYLATE AND BENAZEPRIL HYDROCHLORIDE 10; 20 MG/1; MG/1
1 CAPSULE ORAL DAILY
Qty: 90 CAPSULE | OUTPATIENT
Start: 2023-05-24

## 2023-06-02 ENCOUNTER — TELEPHONE (OUTPATIENT)
Dept: FAMILY MEDICINE CLINIC | Facility: CLINIC | Age: 43
End: 2023-06-02

## 2023-06-02 NOTE — TELEPHONE ENCOUNTER
Caller: PHILIP ZAVALA    Relationship to patient: Emergency Contact    Best call back number: [phone]    Chief complaint: NEW PATIENT    Type of visit: NEW PATIENT    Requested date: ASAP     Additional notes: PATIENT'S  STATED SHE CHANGED HER PCM TO FRANKLYN SIN AND WOULD LIKE TO SET UP A NEW PATIENT APPOINTMENT. HUB WAS UNABLE TO FIND NEW PATIENT APPOINTMENT. PLEASE CALL AND ADVISE.

## 2023-06-13 RX ORDER — AMITRIPTYLINE HYDROCHLORIDE 50 MG/1
50 TABLET, FILM COATED ORAL NIGHTLY
Qty: 90 TABLET | Refills: 1 | OUTPATIENT
Start: 2023-06-13

## 2023-09-11 RX ORDER — PANTOPRAZOLE SODIUM 40 MG/1
40 TABLET, DELAYED RELEASE ORAL DAILY
Qty: 90 TABLET | Refills: 0 | OUTPATIENT
Start: 2023-09-11

## 2023-10-07 RX ORDER — AMLODIPINE BESYLATE AND BENAZEPRIL HYDROCHLORIDE 5; 20 MG/1; MG/1
1 CAPSULE ORAL DAILY
Qty: 90 CAPSULE | Refills: 1 | OUTPATIENT
Start: 2023-10-07

## 2023-11-03 ENCOUNTER — APPOINTMENT (OUTPATIENT)
Dept: CT IMAGING | Facility: HOSPITAL | Age: 43
End: 2023-11-03
Payer: OTHER GOVERNMENT

## 2023-11-03 ENCOUNTER — HOSPITAL ENCOUNTER (EMERGENCY)
Facility: HOSPITAL | Age: 43
Discharge: HOME | End: 2023-11-03
Attending: EMERGENCY MEDICINE
Payer: OTHER GOVERNMENT

## 2023-11-03 VITALS
OXYGEN SATURATION: 100 % | RESPIRATION RATE: 18 BRPM | DIASTOLIC BLOOD PRESSURE: 109 MMHG | HEIGHT: 65 IN | WEIGHT: 191.14 LBS | BODY MASS INDEX: 31.85 KG/M2 | SYSTOLIC BLOOD PRESSURE: 146 MMHG | TEMPERATURE: 98.1 F | HEART RATE: 97 BPM

## 2023-11-03 DIAGNOSIS — N39.0 URINARY TRACT INFECTION WITHOUT HEMATURIA, SITE UNSPECIFIED: Primary | ICD-10-CM

## 2023-11-03 DIAGNOSIS — R10.9 FLANK PAIN: ICD-10-CM

## 2023-11-03 DIAGNOSIS — Z76.0 MEDICATION REFILL: ICD-10-CM

## 2023-11-03 LAB
ALBUMIN SERPL-MCNC: 4.2 G/DL (ref 3.5–5.2)
ALBUMIN/GLOB SERPL: 1.4 G/DL
ALP SERPL-CCNC: 127 U/L (ref 39–117)
ALT SERPL W P-5'-P-CCNC: 23 U/L (ref 1–33)
ANION GAP SERPL CALCULATED.3IONS-SCNC: 11.7 MMOL/L (ref 5–15)
AST SERPL-CCNC: 18 U/L (ref 1–32)
BACTERIA UR QL AUTO: ABNORMAL /HPF
BASOPHILS # BLD AUTO: 0.15 10*3/MM3 (ref 0–0.2)
BASOPHILS NFR BLD AUTO: 1.2 % (ref 0–1.5)
BILIRUB SERPL-MCNC: 0.2 MG/DL (ref 0–1.2)
BILIRUB UR QL STRIP: NEGATIVE
BUN SERPL-MCNC: 8 MG/DL (ref 6–20)
BUN/CREAT SERPL: 11.3 (ref 7–25)
CALCIUM SPEC-SCNC: 9.9 MG/DL (ref 8.6–10.5)
CHLORIDE SERPL-SCNC: 103 MMOL/L (ref 98–107)
CLARITY UR: CLEAR
CO2 SERPL-SCNC: 23.3 MMOL/L (ref 22–29)
COLOR UR: YELLOW
CREAT SERPL-MCNC: 0.71 MG/DL (ref 0.57–1)
DEPRECATED RDW RBC AUTO: 46.1 FL (ref 37–54)
EGFRCR SERPLBLD CKD-EPI 2021: 108.3 ML/MIN/1.73
EOSINOPHIL # BLD AUTO: 0.27 10*3/MM3 (ref 0–0.4)
EOSINOPHIL NFR BLD AUTO: 2.2 % (ref 0.3–6.2)
ERYTHROCYTE [DISTWIDTH] IN BLOOD BY AUTOMATED COUNT: 14 % (ref 12.3–15.4)
FLUAV SUBTYP SPEC NAA+PROBE: NOT DETECTED
FLUBV RNA ISLT QL NAA+PROBE: NOT DETECTED
GLOBULIN UR ELPH-MCNC: 2.9 GM/DL
GLUCOSE SERPL-MCNC: 128 MG/DL (ref 65–99)
GLUCOSE UR STRIP-MCNC: NEGATIVE MG/DL
HCG INTACT+B SERPL-ACNC: <0.5 MIU/ML
HCT VFR BLD AUTO: 44.7 % (ref 34–46.6)
HGB BLD-MCNC: 15.2 G/DL (ref 12–15.9)
HGB UR QL STRIP.AUTO: NEGATIVE
HOLD SPECIMEN: NORMAL
HOLD SPECIMEN: NORMAL
HYALINE CASTS UR QL AUTO: ABNORMAL /LPF
IMM GRANULOCYTES # BLD AUTO: 0.03 10*3/MM3 (ref 0–0.05)
IMM GRANULOCYTES NFR BLD AUTO: 0.2 % (ref 0–0.5)
KETONES UR QL STRIP: NEGATIVE
LEUKOCYTE ESTERASE UR QL STRIP.AUTO: ABNORMAL
LIPASE SERPL-CCNC: 34 U/L (ref 13–60)
LYMPHOCYTES # BLD AUTO: 4.47 10*3/MM3 (ref 0.7–3.1)
LYMPHOCYTES NFR BLD AUTO: 35.8 % (ref 19.6–45.3)
MCH RBC QN AUTO: 30.6 PG (ref 26.6–33)
MCHC RBC AUTO-ENTMCNC: 34 G/DL (ref 31.5–35.7)
MCV RBC AUTO: 89.9 FL (ref 79–97)
MONOCYTES # BLD AUTO: 0.86 10*3/MM3 (ref 0.1–0.9)
MONOCYTES NFR BLD AUTO: 6.9 % (ref 5–12)
NEUTROPHILS NFR BLD AUTO: 53.7 % (ref 42.7–76)
NEUTROPHILS NFR BLD AUTO: 6.7 10*3/MM3 (ref 1.7–7)
NITRITE UR QL STRIP: NEGATIVE
NRBC BLD AUTO-RTO: 0 /100 WBC (ref 0–0.2)
PH UR STRIP.AUTO: 7 [PH] (ref 5–8)
PLATELET # BLD AUTO: 476 10*3/MM3 (ref 140–450)
PMV BLD AUTO: 9.2 FL (ref 6–12)
POTASSIUM SERPL-SCNC: 3.9 MMOL/L (ref 3.5–5.2)
PROT SERPL-MCNC: 7.1 G/DL (ref 6–8.5)
PROT UR QL STRIP: NEGATIVE
RBC # BLD AUTO: 4.97 10*6/MM3 (ref 3.77–5.28)
RBC # UR STRIP: ABNORMAL /HPF
REF LAB TEST METHOD: ABNORMAL
RSV RNA NPH QL NAA+NON-PROBE: NOT DETECTED
SARS-COV-2 RNA RESP QL NAA+PROBE: NOT DETECTED
SODIUM SERPL-SCNC: 138 MMOL/L (ref 136–145)
SP GR UR STRIP: 1.01 (ref 1–1.03)
SQUAMOUS #/AREA URNS HPF: ABNORMAL /HPF
UROBILINOGEN UR QL STRIP: ABNORMAL
WBC # UR STRIP: ABNORMAL /HPF
WBC NRBC COR # BLD: 12.48 10*3/MM3 (ref 3.4–10.8)
WHOLE BLOOD HOLD COAG: NORMAL
WHOLE BLOOD HOLD SPECIMEN: NORMAL

## 2023-11-03 PROCEDURE — 25010000002 HYDROMORPHONE 1 MG/ML SOLUTION: Performed by: EMERGENCY MEDICINE

## 2023-11-03 PROCEDURE — 96361 HYDRATE IV INFUSION ADD-ON: CPT

## 2023-11-03 PROCEDURE — 25010000002 CEFTRIAXONE PER 250 MG: Performed by: EMERGENCY MEDICINE

## 2023-11-03 PROCEDURE — 87086 URINE CULTURE/COLONY COUNT: CPT | Performed by: EMERGENCY MEDICINE

## 2023-11-03 PROCEDURE — 25510000001 IOPAMIDOL PER 1 ML: Performed by: EMERGENCY MEDICINE

## 2023-11-03 PROCEDURE — 25010000002 KETOROLAC TROMETHAMINE PER 15 MG: Performed by: NURSE PRACTITIONER

## 2023-11-03 PROCEDURE — 74177 CT ABD & PELVIS W/CONTRAST: CPT

## 2023-11-03 PROCEDURE — 25810000003 SODIUM CHLORIDE 0.9 % SOLUTION: Performed by: NURSE PRACTITIONER

## 2023-11-03 PROCEDURE — 80053 COMPREHEN METABOLIC PANEL: CPT | Performed by: NURSE PRACTITIONER

## 2023-11-03 PROCEDURE — 83690 ASSAY OF LIPASE: CPT | Performed by: NURSE PRACTITIONER

## 2023-11-03 PROCEDURE — 99285 EMERGENCY DEPT VISIT HI MDM: CPT

## 2023-11-03 PROCEDURE — 84702 CHORIONIC GONADOTROPIN TEST: CPT | Performed by: NURSE PRACTITIONER

## 2023-11-03 PROCEDURE — 87637 SARSCOV2&INF A&B&RSV AMP PRB: CPT | Performed by: EMERGENCY MEDICINE

## 2023-11-03 PROCEDURE — 81001 URINALYSIS AUTO W/SCOPE: CPT | Performed by: NURSE PRACTITIONER

## 2023-11-03 PROCEDURE — 96375 TX/PRO/DX INJ NEW DRUG ADDON: CPT

## 2023-11-03 PROCEDURE — 96365 THER/PROPH/DIAG IV INF INIT: CPT

## 2023-11-03 PROCEDURE — 25010000002 ONDANSETRON PER 1 MG: Performed by: EMERGENCY MEDICINE

## 2023-11-03 PROCEDURE — 85025 COMPLETE CBC W/AUTO DIFF WBC: CPT | Performed by: NURSE PRACTITIONER

## 2023-11-03 RX ORDER — KETOROLAC TROMETHAMINE 15 MG/ML
30 INJECTION, SOLUTION INTRAMUSCULAR; INTRAVENOUS ONCE
Status: COMPLETED | OUTPATIENT
Start: 2023-11-03 | End: 2023-11-03

## 2023-11-03 RX ORDER — HYDROCHLOROTHIAZIDE 12.5 MG/1
12.5 TABLET ORAL DAILY
Qty: 30 TABLET | Refills: 0 | Status: SHIPPED | OUTPATIENT
Start: 2023-11-03 | End: 2023-12-03

## 2023-11-03 RX ORDER — IBUPROFEN 600 MG/1
600 TABLET ORAL EVERY 8 HOURS PRN
Qty: 21 TABLET | Refills: 0 | Status: SHIPPED | OUTPATIENT
Start: 2023-11-03 | End: 2023-11-10

## 2023-11-03 RX ORDER — SODIUM CHLORIDE 0.9 % (FLUSH) 0.9 %
10 SYRINGE (ML) INJECTION AS NEEDED
Status: DISCONTINUED | OUTPATIENT
Start: 2023-11-03 | End: 2023-11-03 | Stop reason: HOSPADM

## 2023-11-03 RX ORDER — CEFTRIAXONE SODIUM 1 G/50ML
1000 INJECTION, SOLUTION INTRAVENOUS ONCE
Status: COMPLETED | OUTPATIENT
Start: 2023-11-03 | End: 2023-11-03

## 2023-11-03 RX ORDER — KETOROLAC TROMETHAMINE 15 MG/ML
30 INJECTION, SOLUTION INTRAMUSCULAR; INTRAVENOUS ONCE
Status: DISCONTINUED | OUTPATIENT
Start: 2023-11-03 | End: 2023-11-03 | Stop reason: SDUPTHER

## 2023-11-03 RX ORDER — AMLODIPINE BESYLATE AND BENAZEPRIL HYDROCHLORIDE 5; 20 MG/1; MG/1
1 CAPSULE ORAL DAILY
COMMUNITY
Start: 2023-07-12 | End: 2023-11-03

## 2023-11-03 RX ORDER — ONDANSETRON 2 MG/ML
4 INJECTION INTRAMUSCULAR; INTRAVENOUS ONCE
Status: COMPLETED | OUTPATIENT
Start: 2023-11-03 | End: 2023-11-03

## 2023-11-03 RX ORDER — AMLODIPINE BESYLATE AND BENAZEPRIL HYDROCHLORIDE 5; 20 MG/1; MG/1
1 CAPSULE ORAL DAILY
Qty: 30 CAPSULE | Refills: 0 | Status: SHIPPED | OUTPATIENT
Start: 2023-11-03 | End: 2023-12-03

## 2023-11-03 RX ORDER — CEPHALEXIN 500 MG/1
500 CAPSULE ORAL 4 TIMES DAILY
Qty: 28 CAPSULE | Refills: 0 | Status: SHIPPED | OUTPATIENT
Start: 2023-11-03 | End: 2023-11-10

## 2023-11-03 RX ADMIN — IOPAMIDOL 100 ML: 755 INJECTION, SOLUTION INTRAVENOUS at 07:32

## 2023-11-03 RX ADMIN — ONDANSETRON 4 MG: 2 INJECTION INTRAMUSCULAR; INTRAVENOUS at 06:13

## 2023-11-03 RX ADMIN — HYDROMORPHONE HYDROCHLORIDE 1 MG: 1 INJECTION, SOLUTION INTRAMUSCULAR; INTRAVENOUS; SUBCUTANEOUS at 06:49

## 2023-11-03 RX ADMIN — CEFTRIAXONE SODIUM 1000 MG: 1 INJECTION, SOLUTION INTRAVENOUS at 07:48

## 2023-11-03 RX ADMIN — SODIUM CHLORIDE 1000 ML: 9 INJECTION, SOLUTION INTRAVENOUS at 05:56

## 2023-11-03 RX ADMIN — KETOROLAC TROMETHAMINE 30 MG: 15 INJECTION, SOLUTION INTRAMUSCULAR; INTRAVENOUS at 05:57

## 2023-11-03 NOTE — DISCHARGE INSTRUCTIONS
Please follow-up with your doctor in 48 hours to review the urine culture that was performed today to determine if the proper antibiotics were prescribed    Please follow-up with your doctor Monday for serial reexamination of the abdomen and flank.  Return to the emergency room immediately for intractable pain, intractable vomiting, fever, shaking chills, muscle aches, near passing out, passing out or any new symptoms you are concerned with

## 2023-11-03 NOTE — ED TRIAGE NOTES
Patient has pain in left flank and middle back. Patient has history of kidney stones. Pain has been constantly worsening for 3 days.

## 2023-11-03 NOTE — ED PROVIDER NOTES
Time: 5:28 AM EDT  Date of encounter:  11/3/2023  Independent Historian/Clinical History and Information was obtained by:   Patient    History is limited by: N/A    Chief Complaint: Left flank pain      History of Present Illness:  Patient is a 43 y.o. year old female who presents to the emergency department for evaluation of left flank pain.  Patient is not sure when her left flank pain started.  She states that she has had kidney stones for years.  She states she always has pain.  It is worse this morning.  The patient has nausea but no vomiting.  The patient states that she had a fever 101 2 days ago.  She denies any rigors.  The patient denies any urinary frequency, urgency or dysuria.  Patient states that she has had no back pain.  The patient denies any abdominal pain.  She has had no hematochezia melena or diarrhea.  The patient has no urinary frequency urgency or dysuria.  Patient also notes that she fired her physician.  She is requesting refills on her blood pressure medication.    HPI    Patient Care Team  Primary Care Provider: Provider, No Known    Past Medical History:     No Known Allergies  Past Medical History:   Diagnosis Date    Alcoholism     Anxiety     Depression     GERD (gastroesophageal reflux disease)     Hypertension     Kidney disease     Legally blind in right eye, as defined in [place]     Seizures      Past Surgical History:   Procedure Laterality Date    CYSTOSCOPY W/ URETEROSCOPY      EYE ENUCLEATION Right     LIPOMA EXCISION      URETERAL STENT INSERTION      URETEROSCOPY       Family History   Problem Relation Age of Onset    Stroke Mother     Cancer Mother     Ovarian cancer Mother     Multiple sclerosis Father     Cancer Maternal Grandmother     Heart attack Maternal Grandmother     Cancer Paternal Grandmother        Home Medications:  Prior to Admission medications    Medication Sig Start Date End Date Taking? Authorizing Provider   ALPRAZolam (XANAX) 1 MG tablet Take 1 tablet by  mouth 2 (Two) Times a Day As Needed. 7/6/22   Chapis Watkins MD   amitriptyline (ELAVIL) 50 MG tablet TAKE 1 TABLET BY MOUTH EVERY NIGHT 3/15/23   Valentin Ward APRN   amLODIPine-benazepril (Lotrel) 10-20 MG per capsule Take 1 capsule by mouth Daily. 2/24/23   Valentin Ward APRN   gabapentin (NEURONTIN) 300 MG capsule Take 1 capsule by mouth 3 (Three) Times a Day. 7/6/22   Chapis Watkins MD   hydroCHLOROthiazide (HYDRODIURIL) 12.5 MG tablet TAKE 1 TABLET BY MOUTH DAILY 3/20/23   Valentin Ward APRN   OLANZapine (zyPREXA) 5 MG tablet Take 1 tablet by mouth every night at bedtime. 8/9/22   Chapis Watkins MD   pantoprazole (PROTONIX) 40 MG EC tablet TAKE 1 TABLET BY MOUTH DAILY 2/27/23   Valentin Ward APRN   rOPINIRole (REQUIP) 0.25 MG tablet TAKE 1 TABLET BY MOUTH EVERY NIGHT 1 HOUR BEFORE BEDTIME 3/20/23   Valentin Ward APRN   zolpidem CR (AMBIEN CR) 6.25 MG CR tablet TAKE 1 TABLET BY MOUTH EVERY NIGHT AS NEEDED 7/6/22   Chapis Watkins MD        Social History:   Social History     Tobacco Use    Smoking status: Every Day     Packs/day: 1.00     Years: 24.00     Additional pack years: 0.00     Total pack years: 24.00     Types: Cigarettes    Smokeless tobacco: Never   Vaping Use    Vaping Use: Never used   Substance Use Topics    Alcohol use: Not Currently    Drug use: Never         Review of Systems:  Review of Systems   Constitutional:  Positive for fever. Negative for chills and diaphoresis.   HENT:  Negative for congestion, postnasal drip, rhinorrhea and sore throat.    Eyes:  Negative for photophobia.   Respiratory:  Negative for cough, chest tightness and shortness of breath.    Cardiovascular:  Negative for chest pain, palpitations and leg swelling.   Gastrointestinal:  Positive for nausea. Negative for abdominal pain, diarrhea and vomiting.   Genitourinary:  Positive for flank pain. Negative for difficulty urinating, dysuria, frequency, hematuria and urgency.   Musculoskeletal:   "Negative for neck pain and neck stiffness.   Skin:  Negative for pallor and rash.   Neurological:  Negative for dizziness, syncope, weakness, numbness and headaches.   Hematological:  Negative for adenopathy. Does not bruise/bleed easily.   Psychiatric/Behavioral: Negative.          Physical Exam:  BP (!) 146/109 (Patient Position: Sitting)   Pulse 97   Temp 98.1 °F (36.7 °C) (Oral)   Resp 18   Ht 165.1 cm (65\")   Wt 86.7 kg (191 lb 2.2 oz)   LMP 10/27/2023   SpO2 100%   BMI 31.81 kg/m²     Physical Exam  Vitals and nursing note reviewed.   Constitutional:       General: She is not in acute distress.     Appearance: Normal appearance. She is not ill-appearing, toxic-appearing or diaphoretic.   HENT:      Head: Normocephalic and atraumatic.      Mouth/Throat:      Mouth: Mucous membranes are moist.   Eyes:      Pupils: Pupils are equal, round, and reactive to light.   Cardiovascular:      Rate and Rhythm: Normal rate and regular rhythm.      Pulses: Normal pulses.           Carotid pulses are 2+ on the right side and 2+ on the left side.       Radial pulses are 2+ on the right side and 2+ on the left side.        Femoral pulses are 2+ on the right side and 2+ on the left side.       Popliteal pulses are 2+ on the right side and 2+ on the left side.        Dorsalis pedis pulses are 2+ on the right side and 2+ on the left side.        Posterior tibial pulses are 2+ on the right side and 2+ on the left side.      Heart sounds: Normal heart sounds. No murmur heard.  Pulmonary:      Effort: Pulmonary effort is normal. No accessory muscle usage, respiratory distress or retractions.      Breath sounds: Normal breath sounds. No wheezing, rhonchi or rales.   Abdominal:      General: Abdomen is flat. There is no distension.      Palpations: Abdomen is soft. There is no mass or pulsatile mass.      Tenderness: There is no abdominal tenderness. There is left CVA tenderness. There is no right CVA tenderness, guarding or " rebound.      Comments: No rigidity   Musculoskeletal:         General: No swelling, tenderness or deformity.      Cervical back: Neck supple. No tenderness.      Thoracic back: Spasms and tenderness present. No deformity, signs of trauma, lacerations or bony tenderness. Decreased range of motion.      Lumbar back: No tenderness or bony tenderness. Decreased range of motion.        Back:       Right lower leg: No edema.      Left lower leg: No edema.   Skin:     General: Skin is warm and dry.      Capillary Refill: Capillary refill takes less than 2 seconds.      Coloration: Skin is not jaundiced or pale.      Findings: No erythema.   Neurological:      General: No focal deficit present.      Mental Status: She is alert and oriented to person, place, and time. Mental status is at baseline.      Cranial Nerves: Cranial nerves 2-12 are intact. No cranial nerve deficit.      Sensory: Sensation is intact. No sensory deficit.      Motor: Motor function is intact. No weakness or pronator drift.      Coordination: Coordination is intact. Coordination normal.   Psychiatric:         Mood and Affect: Mood normal.         Behavior: Behavior normal.                Procedures:  Procedures      Medical Decision Making:      Comorbidities that affect care:    Anxiety, alcoholism, hypertension, GERD, frequent kidney stones, seizures    External Notes reviewed:    None      The following orders were placed and all results were independently analyzed by me:  Orders Placed This Encounter   Procedures    COVID-19, FLU A/B, RSV PCR - Swab, Nasopharynx    Urine Culture - Urine, Urine, Clean Catch    CT Abdomen Pelvis With Contrast    [place] Draw    Comprehensive Metabolic Panel    Lipase    Urinalysis With Microscopic If Indicated (No Culture) - Urine, Clean Catch    hCG, Quantitative, Pregnancy    CBC Auto Differential    Urinalysis, Microscopic Only - Urine, Clean Catch    Insert Peripheral IV    Insert peripheral IV    CBC &  Differential    Green Top (Gel)    Lavender Top    Gold Top - SST    Light Blue Top       Medications Given in the Emergency Department:  Medications   sodium chloride 0.9 % flush 10 mL (has no administration in time range)   sodium chloride 0.9 % flush 10 mL (has no administration in time range)   ketorolac (TORADOL) injection 30 mg (30 mg Intravenous Given 11/3/23 0557)   sodium chloride 0.9 % bolus 1,000 mL (0 mL Intravenous Stopped 11/3/23 0653)   HYDROmorphone (DILAUDID) injection 1 mg (1 mg Intravenous Given 11/3/23 0649)   ondansetron (ZOFRAN) injection 4 mg (4 mg Intravenous Given 11/3/23 0613)   iopamidol (ISOVUE-370) 76 % injection 100 mL (100 mL Intravenous Given 11/3/23 0732)   cefTRIAXone (ROCEPHIN) IVPB 1,000 mg (1,000 mg Intravenous New Bag 11/3/23 0748)        ED Course:    ED Course as of 11/03/23 0818 Fri Nov 03, 2023   0814 Potassium: 3.9 [SD]      ED Course User Index  [SD] Jimmy Louise DO       Labs:    Lab Results (last 24 hours)       Procedure Component Value Units Date/Time    CBC & Differential [[id number]]  (Abnormal) Collected: 11/03/23 0555    Specimen: Blood Updated: 11/03/23 0627    Narrative:      The following orders were created for panel order CBC & Differential.  Procedure                               Abnormality         Status                     ---------                               -----------         ------                     CBC Auto Differential[[id number]]        Abnormal            Final result                 Please view results for these tests on the individual orders.    Comprehensive Metabolic Panel [[id number]]  (Abnormal) Collected: 11/03/23 0555    Specimen: Blood Updated: 11/03/23 0644     Glucose 128 mg/dL      BUN 8 mg/dL      Creatinine 0.71 mg/dL      Sodium 138 mmol/L      Potassium 3.9 mmol/L      Comment: Slight hemolysis detected by analyzer. Results may be affected.        Chloride 103 mmol/L      CO2 23.3 mmol/L      Calcium 9.9 mg/dL      Total  Protein 7.1 g/dL      Albumin 4.2 g/dL      ALT (SGPT) 23 U/L      AST (SGOT) 18 U/L      Alkaline Phosphatase 127 U/L      Total Bilirubin 0.2 mg/dL      Globulin 2.9 gm/dL      A/G Ratio 1.4 g/dL      BUN/Creatinine Ratio 11.3     Anion Gap 11.7 mmol/L      eGFR 108.3 mL/min/1.73     Narrative:      GFR Normal >60  Chronic Kidney Disease <60  Kidney Failure <15      Lipase [[id number]]  (Normal) Collected: 11/03/23 0555    Specimen: Blood Updated: 11/03/23 0644     Lipase 34 U/L     Urinalysis With Microscopic If Indicated (No Culture) - Urine, Clean Catch [[id number]]  (Abnormal) Collected: 11/03/23 0555    Specimen: Urine, Clean Catch Updated: 11/03/23 0717     Color, UA Yellow     Appearance, UA Clear     pH, UA 7.0     Specific Gravity, UA 1.009     Glucose, UA Negative     Ketones, UA Negative     Bilirubin, UA Negative     Blood, UA Negative     Protein, UA Negative     Leuk Esterase, UA Moderate (2+)     Nitrite, UA Negative     Urobilinogen, UA 0.2 E.U./dL    hCG, Quantitative, Pregnancy [[id number]] Collected: 11/03/23 0555    Specimen: Blood Updated: 11/03/23 0642     HCG Quantitative <0.50 mIU/mL     Narrative:      HCG Ranges by Gestational Age    Females - non-pregnant premenopausal   </= 1mIU/mL HCG  Females - postmenopausal               </= 7mIU/mL HCG    3 Weeks       5.4   -      72 mIU/mL  4 Weeks      10.2   -     708 mIU/mL  5 Weeks       217   -   8,245 mIU/mL  6 Weeks       152   -  32,177 mIU/mL  7 Weeks     4,059   - 153,767 mIU/mL  8 Weeks    31,366   - 149,094 mIU/mL  9 Weeks    59,109   - 135,901 mIU/mL  10 Weeks   44,186   - 170,409 mIU/mL  12 Weeks   27,107   - 201,615 mIU/mL  14 Weeks   24,302   -  93,646 mIU/mL  15 Weeks   12,540   -  69,747 mIU/mL  16 Weeks    8,904   -  55,332 mIU/mL  17 Weeks    8,240   -  51,793 mIU/mL  18 Weeks    9,649   -  55,271 mIU/mL      CBC Auto Differential [[id number]]  (Abnormal) Collected: 11/03/23 0555    Specimen: Blood Updated: 11/03/23 0627     WBC  12.48 10*3/mm3      RBC 4.97 10*6/mm3      Hemoglobin 15.2 g/dL      Hematocrit 44.7 %      MCV 89.9 fL      MCH 30.6 pg      MCHC 34.0 g/dL      RDW 14.0 %      RDW-SD 46.1 fl      MPV 9.2 fL      Platelets 476 10*3/mm3      Neutrophil % 53.7 %      Lymphocyte % 35.8 %      Monocyte % 6.9 %      Eosinophil % 2.2 %      Basophil % 1.2 %      Immature Grans % 0.2 %      Neutrophils, Absolute 6.70 10*3/mm3      Lymphocytes, Absolute 4.47 10*3/mm3      Monocytes, Absolute 0.86 10*3/mm3      Eosinophils, Absolute 0.27 10*3/mm3      Basophils, Absolute 0.15 10*3/mm3      Immature Grans, Absolute 0.03 10*3/mm3      nRBC 0.0 /100 WBC     Urinalysis, Microscopic Only - Urine, Clean Catch [[id number]]  (Abnormal) Collected: 11/03/23 0555    Specimen: Urine, Clean Catch Updated: 11/03/23 0717     RBC, UA 0-2 /HPF      WBC, UA 21-50 /HPF      Bacteria, UA 2+ /HPF      Squamous Epithelial Cells, UA 3-6 /HPF      Hyaline Casts, UA 7-12 /LPF      Methodology Automated Microscopy    Urine Culture - Urine, Urine, Clean Catch [[id number]] Collected: 11/03/23 0555    Specimen: Urine, Clean Catch Updated: 11/03/23 0741    COVID-19, FLU A/B, RSV PCR - Swab, Nasopharynx [[id number]]  (Normal) Collected: 11/03/23 0616    Specimen: Swab from Nasopharynx Updated: 11/03/23 0703     COVID19 Not Detected     Influenza A PCR Not Detected     Influenza B PCR Not Detected     RSV, PCR Not Detected    Narrative:      Fact sheet for providers: https://www.fda.gov/media/910901/download    Fact sheet for patients: https://www.fda.gov/media/110495/download    Test performed by PCR.             Imaging:    CT Abdomen Pelvis With Contrast    Result Date: 11/3/2023  PROCEDURE: CT ABDOMEN PELVIS W CONTRAST  COMPARISON: [facility], CT, CT ABDOMEN PELVIS WO CONTRAST, 11/03/2022, 8:19.  INDICATIONS: LEFT flank pain/ ab pain/ hx stones  TECHNIQUE: After obtaining the patient's consent, CT images were created with non-ionic intravenous  contrast material.   PROTOCOL:   Standard imaging protocol performed    RADIATION:   DLP: 1091.8 mGy*cm   Automated exposure control was utilized to minimize radiation dose. CONTRAST: 90 cc Isovue 370 I.V. LABS:   eGFR: >60 ml/min/1.73m2  FINDINGS:  Heart size normal.  No acute finding in the lower lungs.  Unchanged size and contour of the liver.  Diffuse parenchymal hypoattenuation suggesting mild to moderate steatosis.  Spleen is unremarkable.  Gallbladder and biliary tree unremarkable.  No signs of active pancreatitis or suspicious mass.  No adrenal nodule.  Symmetric renal size, contour and enhancement.  Tiny low-density left upper pole renal cyst.  There are approximately 4 renal calculi, 1 on the left and 3 on the right largest in the right mid to inferior pole measuring up to 6 mm.  Urinary bladder grossly unremarkable.  Anteverted uterus unremarkable by CT.  Incidental left corpus luteal cyst.  Otherwise symmetric appearance of the ovaries with trace likely physiologic pelvic fluid.  No bowel obstruction or active inflammation.  Normal appendix.  Mild formed stool.  Normal course and caliber abdominal aorta.  No suspicious adenopathy.  Minimal atherosclerotic disease noted within iliac vasculature.  No free air or drainable collection.  Small fat containing umbilical hernia without associated inflammatory changes or contained bowel.  No acute fracture or aggressive lesion.  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        1. No acute findings in the abdomen. 2. Multiple non-obstructing renal calculi largest measuring 6 mm within the right kidney. 3. Benign left corpus luteal cyst, a potential source of pain. 4. Hepatic steatosis.      PHILIP DUNBAR MD       Electronically Signed and Approved By: PHILIP DUNBAR MD on 11/03/2023 at 7:46                Differential Diagnosis and Discussion:    Flank Pain: Differential diagnosis includes but is not limited to kidney stones, pyelonephritis, musculoskeletal  disorders, renal infarction, urinary tract infection, hydronephrosis, radiculopathy, aortic aneurysm, renal cell carcinoma.    All labs were reviewed and interpreted by me.  CT scan radiology impression was interpreted by me.    MDM  Number of Diagnoses or Management Options  Flank pain  Medication refill  Urinary tract infection without hematuria, site unspecified  Diagnosis management comments: The patient's CBC was reviewed and shows no abnormalities of critical concern.  Of note, there is no anemia requiring a blood transfusion and the platelet count is acceptable.  White blood cell count elevated at 12.5    The patient's Covid swab was negative   The patient's Influenza swab was negative     The patient's CMP was reviewed and shows no abnormalities of critical concern.  Of note, the patient's sodium and potassium are acceptable.  The patient's liver enzymes are unremarkable.  The patient's renal function including creatinine is preserved.  The patient has a normal anion gap.    The patient's pregnancy test was negative.    Patient's urinalysis demonstrated moderate leuk esterase, 21-50 WBCs, 2+ bacteria.  I do feel this patient had a UTI.  He was treated with Rocephin IV in the emergency room.  The patient will be sent home with Keflex.    CT scan of the abdomen pelvis demonstrated no acute findings.  There were multiple nonobstructing renal calculi, largest 6 mm within the right kidney.  There is no evidence of hydronephrosis or hydroureter.  There was a benign left corpus luteal cyst I do not feel this was contributing to the patient's pain as the patient has high left flank pain    The patient was given Dilaudid and Toradol in the emergency room.  The patient was reassessed.  The patient was resting comfortably and pain controlled at time of discharge.    The patient's blood pressure medicine will be refilled.  The patient be placed on Keflex and Motrin.  I will have the patient follow-up with her primary  care physician on Monday for serial reexamination of the abdomen and flank.    The patient's symptoms are consistent with musculoskeletal back pain.  The patient is now resting comfortably, feels better, is alert, talkative, interactive in no distress.  Repeat examination is unremarkable and benign.  Patient is neurologically intact and is ambulatory in the emergency department.  The patient has no fever, no bowel or bladder incontinence, no saddle anesthesia and is otherwise alert and well-appearing.  The history, physical exam and diagnostics do not suggest the presence of acute spinal epidural abscess, acute aortic aneurysm, aortic dissection or other process requiring further testing, treatment or consultation in the emergency department.  The vital signs have been stable.  The patient's condition is stable and appropriate for discharge.  The patient will pursue further outpatient evaluation with their primary care physician or other designated for consulting physician as indicated in the discharge instructions.           Amount and/or Complexity of Data Reviewed  Clinical lab tests: reviewed  Tests in the radiology section of CPT®: reviewed  Tests in the medicine section of CPT®: reviewed         Social Determinants of Health:    Patient is independent, reliable, and has access to care.       Disposition and Care Coordination:    Discharged: The patient is suitable and stable for discharge with no need for consideration of observation or admission.    I have explained discharge medications and the need for follow up with the patient/caretakers. This was also printed in the discharge instructions. Patient was discharged with the following medications and follow up:      Medication List        New Prescriptions      cephalexin 500 MG capsule  Commonly known as: KEFLEX  Take 1 capsule by mouth 4 (Four) Times a Day for 7 days.     ibuprofen 600 MG tablet  Commonly known as: ADVIL,MOTRIN  Take 1 tablet by mouth  Every 8 (Eight) Hours As Needed for Mild Pain for up to 7 days.               Where to Get Your Medications        These medications were sent to [id number] - [place], [place] - [address] AT [facility] - [phone]  - [phone] FX  [address], [place] [address]      Phone: [phone]   amLODIPine-benazepril 5-20 MG per capsule  cephalexin 500 MG capsule  hydroCHLOROthiazide 12.5 MG tablet  ibuprofen 600 MG tablet      Provider, No Known  [facility]  [place] [address]    On 11/6/2023  Urine culture follow-up, call for appointment       Final diagnoses:   Urinary tract infection without hematuria, site unspecified   Medication refill   Flank pain        ED Disposition       ED Disposition   Discharge    Condition   Stable    Comment   --               This medical record created using voice recognition software.             Jimmy Louise DO  11/03/23 0817       Jimmy Louise DO  11/03/23 0818

## 2023-11-04 LAB — BACTERIA SPEC AEROBE CULT: NO GROWTH

## 2023-11-28 ENCOUNTER — HOSPITAL ENCOUNTER (INPATIENT)
Facility: HOSPITAL | Age: 43
LOS: 6 days | Discharge: HOME | DRG: 882 | End: 2023-12-04
Attending: PSYCHIATRY & NEUROLOGY | Admitting: PSYCHIATRY & NEUROLOGY
Payer: OTHER GOVERNMENT

## 2023-11-28 PROBLEM — F29 PSYCHOSIS: Status: ACTIVE | Noted: 2023-11-28

## 2023-11-28 LAB
AMPHET+METHAMPHET UR QL: NEGATIVE
BARBITURATES UR QL SCN: NEGATIVE
BENZODIAZ UR QL SCN: POSITIVE
BILIRUB UR QL STRIP: NEGATIVE
CANNABINOIDS SERPL QL: POSITIVE
CLARITY UR: ABNORMAL
COCAINE UR QL: NEGATIVE
COLOR UR: YELLOW
FENTANYL UR-MCNC: NEGATIVE NG/ML
GLUCOSE UR STRIP-MCNC: NEGATIVE MG/DL
HGB UR QL STRIP.AUTO: NEGATIVE
KETONES UR QL STRIP: NEGATIVE
LEUKOCYTE ESTERASE UR QL STRIP.AUTO: ABNORMAL
METHADONE UR QL SCN: NEGATIVE
NITRITE UR QL STRIP: NEGATIVE
OPIATES UR QL: NEGATIVE
OXYCODONE UR QL SCN: NEGATIVE
PH UR STRIP.AUTO: 6 [PH] (ref 5–8)
PROT UR QL STRIP: NEGATIVE
SP GR UR STRIP: 1.01 (ref 1–1.03)
UROBILINOGEN UR QL STRIP: ABNORMAL

## 2023-11-28 PROCEDURE — 80307 DRUG TEST PRSMV CHEM ANLYZR: CPT | Performed by: PSYCHIATRY & NEUROLOGY

## 2023-11-28 PROCEDURE — 81003 URINALYSIS AUTO W/O SCOPE: CPT | Performed by: PSYCHIATRY & NEUROLOGY

## 2023-11-28 RX ORDER — LOPERAMIDE HYDROCHLORIDE 2 MG/1
2 CAPSULE ORAL
Status: DISCONTINUED | OUTPATIENT
Start: 2023-11-28 | End: 2023-12-04 | Stop reason: HOSPADM

## 2023-11-28 RX ORDER — LORAZEPAM 2 MG/1
2 TABLET ORAL EVERY 4 HOURS PRN
Status: DISCONTINUED | OUTPATIENT
Start: 2023-11-28 | End: 2023-12-04 | Stop reason: HOSPADM

## 2023-11-28 RX ORDER — HALOPERIDOL 5 MG/1
5 TABLET ORAL EVERY 4 HOURS PRN
Status: DISCONTINUED | OUTPATIENT
Start: 2023-11-28 | End: 2023-12-04 | Stop reason: HOSPADM

## 2023-11-28 RX ORDER — FAMOTIDINE 20 MG/1
20 TABLET, FILM COATED ORAL 2 TIMES DAILY PRN
Status: DISCONTINUED | OUTPATIENT
Start: 2023-11-28 | End: 2023-12-04 | Stop reason: HOSPADM

## 2023-11-28 RX ORDER — DIPHENHYDRAMINE HYDROCHLORIDE 50 MG/ML
50 INJECTION INTRAMUSCULAR; INTRAVENOUS EVERY 4 HOURS PRN
Status: DISCONTINUED | OUTPATIENT
Start: 2023-11-28 | End: 2023-12-04 | Stop reason: HOSPADM

## 2023-11-28 RX ORDER — HALOPERIDOL 5 MG/ML
5 INJECTION INTRAMUSCULAR EVERY 4 HOURS PRN
Status: DISCONTINUED | OUTPATIENT
Start: 2023-11-28 | End: 2023-12-04 | Stop reason: HOSPADM

## 2023-11-28 RX ORDER — TRAZODONE HYDROCHLORIDE 50 MG/1
50 TABLET ORAL NIGHTLY PRN
Status: DISCONTINUED | OUTPATIENT
Start: 2023-11-28 | End: 2023-11-30

## 2023-11-28 RX ORDER — HYDROXYZINE HYDROCHLORIDE 25 MG/1
50 TABLET, FILM COATED ORAL EVERY 6 HOURS PRN
Status: DISCONTINUED | OUTPATIENT
Start: 2023-11-28 | End: 2023-12-04 | Stop reason: HOSPADM

## 2023-11-28 RX ORDER — NICOTINE 21 MG/24HR
1 PATCH, TRANSDERMAL 24 HOURS TRANSDERMAL EVERY 24 HOURS
Status: DISCONTINUED | OUTPATIENT
Start: 2023-11-28 | End: 2023-11-30

## 2023-11-28 RX ORDER — LORAZEPAM 2 MG/ML
2 INJECTION INTRAMUSCULAR EVERY 4 HOURS PRN
Status: DISCONTINUED | OUTPATIENT
Start: 2023-11-28 | End: 2023-12-04 | Stop reason: HOSPADM

## 2023-11-28 RX ORDER — DIPHENHYDRAMINE HCL 50 MG
50 CAPSULE ORAL EVERY 4 HOURS PRN
Status: DISCONTINUED | OUTPATIENT
Start: 2023-11-28 | End: 2023-12-04 | Stop reason: HOSPADM

## 2023-11-28 RX ORDER — ACETAMINOPHEN 325 MG/1
650 TABLET ORAL EVERY 6 HOURS PRN
Status: DISCONTINUED | OUTPATIENT
Start: 2023-11-28 | End: 2023-12-04 | Stop reason: HOSPADM

## 2023-11-28 RX ORDER — ALUMINA, MAGNESIA, AND SIMETHICONE 2400; 2400; 240 MG/30ML; MG/30ML; MG/30ML
15 SUSPENSION ORAL EVERY 6 HOURS PRN
Status: DISCONTINUED | OUTPATIENT
Start: 2023-11-28 | End: 2023-12-04 | Stop reason: HOSPADM

## 2023-11-28 RX ADMIN — TRAZODONE HYDROCHLORIDE 50 MG: 50 TABLET ORAL at 22:29

## 2023-11-28 RX ADMIN — NICOTINE 1 PATCH: 21 PATCH, EXTENDED RELEASE TRANSDERMAL at 16:15

## 2023-11-28 NOTE — NURSING NOTE
43 year old female admitted to unit on MIW from [facility] with a diagnosis of psychosis. Per miw evaluator patient was aggressive and acting bizarre with spouse and adult daughter. Patient's daughter initiated the MIW and reported that mom was throwing things and locked herself in the bathroom. Patient denies daughter's allegations and reports she is here because she had a meltdown and thought her mother was dead. Patient reports that her  is a manipulator and he put her in  here and he is trying to drive her crazy and commit suicide. Patient's  and daughter have an EPO against patient and they are not allowed to contact one another. Patient is aware of the court order. Patient denies s/I, h/I or hallucinations. Patient was visible upset during assessment and reports she is afraid no one believes her. Patient R eye has been surgically removed and she has a prosthetic at home. Patient is legally blind and uses a cane when ambulating and order was placed to allow patient to use cane while on the unit. Will continue to monitor for changes in mood or behavior.

## 2023-11-28 NOTE — CASE MANAGEMENT/SOCIAL WORK
"Patient's spouse arrived on the unit today.  Spouse dropping off patient's personal belongings and also wanted to discuss patient's admission.  During conversation spouse informs staff that there an emergency protection order was filed earlier today against the patient.  Spouse reports the patient has been physically aggressive towards him and towards their daughter who also lives in the home.     Staff informed patient's spouse that EPO restricts interaction with the patient face-to-face via phone and through other means.  During this interaction the patient called, spouse answered the phone and began to have an argument with the patient.  Patient's daughter who is also present states \"he does not need to be talking to her\" daughter reports the patient becomes aggressive towards them and towards the animals in the home as well.  Daughter states \"I am done with her\" patient's spouse strongly encouraged and the recommendation made that he not make contact with the patient since there is a legal protection order in place.      Spouse also informed that due to this court order the patient will need additional options for placement if and when she is discharged from the unit.  Spouse did confirm the patient cannot return to their home and patient upset because she is essentially now homeless.  Staff informs spouse to make arrangements for possible placement in a hotel until they are able to resolve this in the courts.  Spouse informed that only other options are homeless shelters in the [facility].  Patient belongings were handed to staff,  one  bookbag that will be inventoried by nursing staff.  "

## 2023-11-29 PROBLEM — F43.10 POST TRAUMATIC STRESS DISORDER (PTSD): Status: ACTIVE | Noted: 2023-11-29

## 2023-11-29 LAB
ALBUMIN SERPL-MCNC: 4.4 G/DL (ref 3.5–5.2)
ALBUMIN/GLOB SERPL: 1.4 G/DL
ALP SERPL-CCNC: 149 U/L (ref 39–117)
ALT SERPL W P-5'-P-CCNC: 34 U/L (ref 1–33)
ANION GAP SERPL CALCULATED.3IONS-SCNC: 16.9 MMOL/L (ref 5–15)
AST SERPL-CCNC: 24 U/L (ref 1–32)
BASOPHILS # BLD AUTO: 0.1 10*3/MM3 (ref 0–0.2)
BASOPHILS NFR BLD AUTO: 1.2 % (ref 0–1.5)
BILIRUB SERPL-MCNC: 0.6 MG/DL (ref 0–1.2)
BUN SERPL-MCNC: 11 MG/DL (ref 6–20)
BUN/CREAT SERPL: 14.1 (ref 7–25)
CALCIUM SPEC-SCNC: 10.4 MG/DL (ref 8.6–10.5)
CHLORIDE SERPL-SCNC: 94 MMOL/L (ref 98–107)
CO2 SERPL-SCNC: 20.1 MMOL/L (ref 22–29)
CREAT SERPL-MCNC: 0.78 MG/DL (ref 0.57–1)
DEPRECATED RDW RBC AUTO: 41.2 FL (ref 37–54)
EGFRCR SERPLBLD CKD-EPI 2021: 96.8 ML/MIN/1.73
EOSINOPHIL # BLD AUTO: 0.09 10*3/MM3 (ref 0–0.4)
EOSINOPHIL NFR BLD AUTO: 1.1 % (ref 0.3–6.2)
ERYTHROCYTE [DISTWIDTH] IN BLOOD BY AUTOMATED COUNT: 13.2 % (ref 12.3–15.4)
GLOBULIN UR ELPH-MCNC: 3.2 GM/DL
GLUCOSE SERPL-MCNC: 168 MG/DL (ref 65–99)
HCT VFR BLD AUTO: 44.4 % (ref 34–46.6)
HGB BLD-MCNC: 15.9 G/DL (ref 12–15.9)
IMM GRANULOCYTES # BLD AUTO: 0.02 10*3/MM3 (ref 0–0.05)
IMM GRANULOCYTES NFR BLD AUTO: 0.2 % (ref 0–0.5)
LYMPHOCYTES # BLD AUTO: 2.11 10*3/MM3 (ref 0.7–3.1)
LYMPHOCYTES NFR BLD AUTO: 26.2 % (ref 19.6–45.3)
MCH RBC QN AUTO: 30.6 PG (ref 26.6–33)
MCHC RBC AUTO-ENTMCNC: 35.8 G/DL (ref 31.5–35.7)
MCV RBC AUTO: 85.4 FL (ref 79–97)
MONOCYTES # BLD AUTO: 0.72 10*3/MM3 (ref 0.1–0.9)
MONOCYTES NFR BLD AUTO: 9 % (ref 5–12)
NEUTROPHILS NFR BLD AUTO: 5 10*3/MM3 (ref 1.7–7)
NEUTROPHILS NFR BLD AUTO: 62.3 % (ref 42.7–76)
NRBC BLD AUTO-RTO: 0 /100 WBC (ref 0–0.2)
PLATELET # BLD AUTO: 471 10*3/MM3 (ref 140–450)
PMV BLD AUTO: 9.2 FL (ref 6–12)
POTASSIUM SERPL-SCNC: 3.3 MMOL/L (ref 3.5–5.2)
PROT SERPL-MCNC: 7.6 G/DL (ref 6–8.5)
RBC # BLD AUTO: 5.2 10*6/MM3 (ref 3.77–5.28)
SODIUM SERPL-SCNC: 131 MMOL/L (ref 136–145)
T4 FREE SERPL-MCNC: 1.35 NG/DL (ref 0.93–1.7)
TSH SERPL DL<=0.05 MIU/L-ACNC: 1.5 UIU/ML (ref 0.27–4.2)
WBC NRBC COR # BLD AUTO: 8.04 10*3/MM3 (ref 3.4–10.8)

## 2023-11-29 PROCEDURE — 80053 COMPREHEN METABOLIC PANEL: CPT | Performed by: PSYCHIATRY & NEUROLOGY

## 2023-11-29 PROCEDURE — 85025 COMPLETE CBC W/AUTO DIFF WBC: CPT | Performed by: PSYCHIATRY & NEUROLOGY

## 2023-11-29 PROCEDURE — 84443 ASSAY THYROID STIM HORMONE: CPT | Performed by: PSYCHIATRY & NEUROLOGY

## 2023-11-29 PROCEDURE — 84439 ASSAY OF FREE THYROXINE: CPT | Performed by: PSYCHIATRY & NEUROLOGY

## 2023-11-29 RX ORDER — HYDROCHLOROTHIAZIDE 12.5 MG/1
12.5 TABLET ORAL DAILY
Status: DISCONTINUED | OUTPATIENT
Start: 2023-11-29 | End: 2023-12-01

## 2023-11-29 RX ORDER — AMLODIPINE BESYLATE 5 MG/1
5 TABLET ORAL
Status: DISCONTINUED | OUTPATIENT
Start: 2023-11-29 | End: 2023-12-03

## 2023-11-29 RX ORDER — ALPRAZOLAM 1 MG/1
1 TABLET ORAL EVERY 6 HOURS PRN
Status: DISCONTINUED | OUTPATIENT
Start: 2023-11-29 | End: 2023-12-04 | Stop reason: HOSPADM

## 2023-11-29 RX ORDER — PANTOPRAZOLE SODIUM 40 MG/1
40 TABLET, DELAYED RELEASE ORAL DAILY
Status: DISCONTINUED | OUTPATIENT
Start: 2023-11-29 | End: 2023-12-04 | Stop reason: HOSPADM

## 2023-11-29 RX ORDER — LISINOPRIL 20 MG/1
20 TABLET ORAL
Status: DISCONTINUED | OUTPATIENT
Start: 2023-11-29 | End: 2023-12-03

## 2023-11-29 RX ADMIN — HYDROCHLOROTHIAZIDE 12.5 MG: 12.5 TABLET ORAL at 09:25

## 2023-11-29 RX ADMIN — NICOTINE 1 PATCH: 21 PATCH, EXTENDED RELEASE TRANSDERMAL at 14:27

## 2023-11-29 RX ADMIN — AMLODIPINE BESYLATE 5 MG: 5 TABLET ORAL at 09:25

## 2023-11-29 RX ADMIN — LISINOPRIL 20 MG: 20 TABLET ORAL at 09:25

## 2023-11-29 RX ADMIN — ALPRAZOLAM 1 MG: 1 TABLET ORAL at 21:21

## 2023-11-29 RX ADMIN — PANTOPRAZOLE SODIUM 40 MG: 40 TABLET, DELAYED RELEASE ORAL at 09:25

## 2023-11-29 RX ADMIN — ALUMINUM HYDROXIDE, MAGNESIUM HYDROXIDE, AND DIMETHICONE 15 ML: 400; 400; 40 SUSPENSION ORAL at 21:20
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
## 2023-11-29 NOTE — PLAN OF CARE
Goal Outcome Evaluation:  Plan of Care Reviewed With: patient  Patient Agreement with Plan of Care: agrees  Patient alert and oriented and calm and cooperative with staff. Patient refused new medication and reports she does not need medication for depression. Patient denies s/I, h/I or hallucinations . Patient has been withdrawn to her room other than attending groups. No inappropriate or aggressive behavior noted. Will continue to monitor for any changes in mood or behavior.

## 2023-11-30 PROBLEM — E87.1 HYPONATREMIA: Status: ACTIVE | Noted: 2023-11-30

## 2023-11-30 PROBLEM — E87.6 HYPOKALEMIA: Status: ACTIVE | Noted: 2023-11-30

## 2023-11-30 LAB
ANION GAP SERPL CALCULATED.3IONS-SCNC: 13.6 MMOL/L (ref 5–15)
BUN SERPL-MCNC: 8 MG/DL (ref 6–20)
BUN/CREAT SERPL: 9.8 (ref 7–25)
CALCIUM SPEC-SCNC: 10.7 MG/DL (ref 8.6–10.5)
CHLORIDE SERPL-SCNC: 95 MMOL/L (ref 98–107)
CO2 SERPL-SCNC: 22.4 MMOL/L (ref 22–29)
CREAT SERPL-MCNC: 0.82 MG/DL (ref 0.57–1)
EGFRCR SERPLBLD CKD-EPI 2021: 91.2 ML/MIN/1.73
GLUCOSE SERPL-MCNC: 187 MG/DL (ref 65–99)
POTASSIUM SERPL-SCNC: 3.5 MMOL/L (ref 3.5–5.2)
SODIUM SERPL-SCNC: 131 MMOL/L (ref 136–145)

## 2023-11-30 PROCEDURE — 80048 BASIC METABOLIC PNL TOTAL CA: CPT | Performed by: PSYCHIATRY & NEUROLOGY

## 2023-11-30 RX ORDER — MIRTAZAPINE 15 MG/1
15 TABLET, FILM COATED ORAL NIGHTLY
Status: DISCONTINUED | OUTPATIENT
Start: 2023-11-30 | End: 2023-12-04 | Stop reason: HOSPADM

## 2023-11-30 RX ORDER — TRAZODONE HYDROCHLORIDE 50 MG/1
100 TABLET ORAL NIGHTLY PRN
Status: DISCONTINUED | OUTPATIENT
Start: 2023-11-30 | End: 2023-12-01

## 2023-11-30 RX ADMIN — ACETAMINOPHEN 650 MG: 325 TABLET ORAL at 12:46

## 2023-11-30 RX ADMIN — LISINOPRIL 20 MG: 20 TABLET ORAL at 08:38

## 2023-11-30 RX ADMIN — PANTOPRAZOLE SODIUM 40 MG: 40 TABLET, DELAYED RELEASE ORAL at 08:38

## 2023-11-30 RX ADMIN — AMLODIPINE BESYLATE 5 MG: 5 TABLET ORAL at 08:38

## 2023-11-30 RX ADMIN — HYDROCHLOROTHIAZIDE 12.5 MG: 12.5 TABLET ORAL at 08:38

## 2023-11-30 RX ADMIN — ALPRAZOLAM 1 MG: 1 TABLET ORAL at 21:27

## 2023-11-30 NOTE — PLAN OF CARE
Goal Outcome Evaluation:  Plan of Care Reviewed With: patient  Patient Agreement with Plan of Care: agrees     Progress: improving.  Patient has been withdrawn to room, makes needs known and has been calm and cooperative.  Patient voices that she is not depressed only ssad that she cannot see her  and daughter right now. Patient has minimized recent events and reason for hospitalization.  Patient has been superficial and vague and often misinterprets statements said to her.  Patient has been cooperative with safety measures.  Patient rated anxiety at 4/10 and requested PRN Xanax which was provided as ordered.  Patient denies S/I, H/I and A/V/H.  Patient resting and sleeping at intervals.  Safe environment provided.

## 2023-11-30 NOTE — PLAN OF CARE
Goal Outcome Evaluation:  Plan of Care Reviewed With: patient  Patient Agreement with Plan of Care: agrees                Pt is alert and oriented and able to make needs known.  Pt denies SI or HI.  Pt denies a/v/h.  Pt was compliant with morning meds, however refused Trintellix.  Pt reports that she is not depressed.  Pt was encourage to discuss this with provider.  Pt has been withdrawn to room today, however attended group.  No s/s of acute distress observed at this time.
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
## 2023-11-30 NOTE — PROGRESS NOTES
[facility]     Psychiatric Progress Note    Patient Name: Anni Pickard  : 1980  MRN: [id number]  Primary Care Physician:  Provider, No Known  Date of admission: 2023    Subjective   Subjective     Patient seen and chart reviewed, discussed with staff.    Chief Complaint: Depression, psychosis        HPI:     Staff reports the patient refused Trintellix because she does not think she is depressed and has not tolerated these medicines well in the past.  Reported anxiety as a 4 and depression as a 0.  He was noted to have broken sleep.    Patient reports that she is better than she was yesterday.  She denies feeling depressed.  She reports that she feels anxious.  She reports that she was able to sleep better last night.  She is complaining of intermittent sweats.  She has an EPO and is unable to contact  or daughter.  She was unable to get a hold of her mom last night.  She denies suicidal homicidal ideation as well as any hallucinations.    Today sitting on bed calmly.  She participates in interview.  Thought processes appear to be linear today.  She is somewhat guarded, there does not appear to be any thought disorganization or delusional thinking today but difficult to fully assess due to her being somewhat guarded and reserved.    Discussed labs from yesterday and that her thyroid was in the reference range.  Discussed that she had low sodium and potassium when she is agreeable to a blood draw today.    She is complaining of sweats today, and feels that she is a little anxious or jittery and that it may be due to her nicotine patch.  She reports that she smokes half a pack a day and will lower her nicotine patch to a 7 mg    States she is anxious but that her anxiety pills took care of it.  She had 1 dose of alprazolam yesterday.      Lab results back from this morning and hypokalemia has resolved, sodium level remained low, but has increased glucose.  Will recheck electrolytes in  "the morning as well as an A1c.      Objective   Objective     Vitals:   Temp:  [97.9 °F (36.6 °C)-98 °F (36.7 °C)] 98 °F (36.7 °C)  Heart Rate:  [100-120] 100  Resp:  [16-18] 18  BP: (115-120)/(96) 115/96          Mental Status Exam:      Appearance:   Sitting calmly in bed, well-developed, well-nourished  Reliability:   Fair  Eye Contact:   Good  Concentration/Focus:    Attentive to the interview  Behaviors:    No restlessness or agitation  Memory :    Sensorium intact  Speech:    Normal rate and volume  Language:   Appropriate, relevant  Mood :    \"I am okay\"  Affect:    Blunted  Thought process:    Guarded, goal directed, no thought disorganization noted  Thought Content:    Denies suicidal or homicidal ideation, denies hallucinations and no evidence of hallucinations  Insight:   Fair  Judgement:    Intact, no behavioral disturbance      Result Review    Result Review:  I have personally reviewed the results from the time of this admission to 11/30/2023 09:49 EST and agree with these findings:  []  Laboratory  []  Microbiology  []  Radiology  []  EKG/Telemetry   []  Cardiology/Vascular   []  Pathology  []  Old records  []  Other:  Most notable findings include:     Lab Results (last 24 hours)       Procedure Component Value Units Date/Time    TSH [[id number]]  (Normal) Collected: 11/29/23 1107    Specimen: Blood from Arm, Left Updated: 11/29/23 1157     TSH 1.500 uIU/mL     T4, Free [[id number]]  (Normal) Collected: 11/29/23 1107    Specimen: Blood from Arm, Left Updated: 11/29/23 1157     Free T4 1.35 ng/dL     Narrative:      Results may be falsely increased if patient taking Biotin.      Comprehensive Metabolic Panel [[id number]]  (Abnormal) Collected: 11/29/23 1107    Specimen: Blood from Arm, Left Updated: 11/29/23 1143     Glucose 168 mg/dL      BUN 11 mg/dL      Creatinine 0.78 mg/dL      Sodium 131 mmol/L      Potassium 3.3 mmol/L      Chloride 94 mmol/L      CO2 20.1 mmol/L      Calcium 10.4 mg/dL      " Total Protein 7.6 g/dL      Albumin 4.4 g/dL      ALT (SGPT) 34 U/L      AST (SGOT) 24 U/L      Alkaline Phosphatase 149 U/L      Total Bilirubin 0.6 mg/dL      Globulin 3.2 gm/dL      A/G Ratio 1.4 g/dL      BUN/Creatinine Ratio 14.1     Anion Gap 16.9 mmol/L      eGFR 96.8 mL/min/1.73     Narrative:      GFR Normal >60  Chronic Kidney Disease <60  Kidney Failure <15      CBC & Differential [[id number]]  (Abnormal) Collected: 11/29/23 1107    Specimen: Blood from Arm, Left Updated: 11/29/23 1128    Narrative:      The following orders were created for panel order CBC & Differential.  Procedure                               Abnormality         Status                     ---------                               -----------         ------                     CBC Auto Differential[[id number]]        Abnormal            Final result                 Please view results for these tests on the individual orders.    CBC Auto Differential [[id number]]  (Abnormal) Collected: 11/29/23 1107    Specimen: Blood from Arm, Left Updated: 11/29/23 1128     WBC 8.04 10*3/mm3      RBC 5.20 10*6/mm3      Hemoglobin 15.9 g/dL      Hematocrit 44.4 %      MCV 85.4 fL      MCH 30.6 pg      MCHC 35.8 g/dL      RDW 13.2 %      RDW-SD 41.2 fl      MPV 9.2 fL      Platelets 471 10*3/mm3      Neutrophil % 62.3 %      Lymphocyte % 26.2 %      Monocyte % 9.0 %      Eosinophil % 1.1 %      Basophil % 1.2 %      Immature Grans % 0.2 %      Neutrophils, Absolute 5.00 10*3/mm3      Lymphocytes, Absolute 2.11 10*3/mm3      Monocytes, Absolute 0.72 10*3/mm3      Eosinophils, Absolute 0.09 10*3/mm3      Basophils, Absolute 0.10 10*3/mm3      Immature Grans, Absolute 0.02 10*3/mm3      nRBC 0.0 /100 WBC                 Medications:   amLODIPine, 5 mg, Oral, Q24H   And  lisinopril, 20 mg, Oral, Q24H  hydroCHLOROthiazide, 12.5 mg, Oral, Daily  nicotine, 1 patch, Transdermal, Q24H  pantoprazole, 40 mg, Oral, Daily  Vortioxetine HBr, 10 mg, Oral, Daily With  Breakfast          Assessment / Plan       Active Hospital Problems:  Active Hospital Problems    Diagnosis     **Post traumatic stress disorder (PTSD)     Psychosis     Esophageal reflux     Insomnia     Blindness of right eye     Essential hypertension        Plan:     Reduce nicotine patch to 7 mg  Discontinue Trintellix  Increase trazodone to 100 mg  Add mirtazapine for anxiety  Monitor for need to move forward MIW process  Monitor electrolytes and awaiting results of labs today  Work on mood stabilization and abatement of any suicidal ideation or psychosis.  Work on appropriate safety plan  Continue supportive therapy  Patient to engage in all group and individual treatment modalities available on the unit  Obtain collateral information if possible  Titrate medications as clinically indicated  Work on appropriate disposition follow-up including referrals to substance abuse treatment if indicated  ,  Repeat BMP in the morning to check sodium  Glucose remains time we will check an A1c the a.m.        Disposition:  I expect patient to be discharged 1 to 2 days.    Part of this note may be an electronic transcription/translation of spoken language to printed text using the Dragon dictation system.         Electronically signed by Duke Navas MD, 11/30/23, 10:26 AM EST.

## 2023-12-01 PROBLEM — E11.9 TYPE 2 DIABETES MELLITUS: Status: ACTIVE | Noted: 2023-12-01

## 2023-12-01 LAB
ANION GAP SERPL CALCULATED.3IONS-SCNC: 17 MMOL/L (ref 5–15)
BUN SERPL-MCNC: 11 MG/DL (ref 6–20)
BUN/CREAT SERPL: 13.1 (ref 7–25)
CALCIUM SPEC-SCNC: 10.7 MG/DL (ref 8.6–10.5)
CHLORIDE SERPL-SCNC: 97 MMOL/L (ref 98–107)
CO2 SERPL-SCNC: 21 MMOL/L (ref 22–29)
CREAT SERPL-MCNC: 0.84 MG/DL (ref 0.57–1)
EGFRCR SERPLBLD CKD-EPI 2021: 88.6 ML/MIN/1.73
GLUCOSE SERPL-MCNC: 154 MG/DL (ref 65–99)
HBA1C MFR BLD: 6.1 % (ref 4.8–5.6)
POTASSIUM SERPL-SCNC: 3.4 MMOL/L (ref 3.5–5.2)
SODIUM SERPL-SCNC: 135 MMOL/L (ref 136–145)
SODIUM UR-SCNC: 35 MMOL/L

## 2023-12-01 PROCEDURE — 83036 HEMOGLOBIN GLYCOSYLATED A1C: CPT | Performed by: PSYCHIATRY & NEUROLOGY

## 2023-12-01 PROCEDURE — 63710000001 ONDANSETRON ODT 4 MG TABLET DISPERSIBLE: Performed by: PSYCHIATRY & NEUROLOGY

## 2023-12-01 PROCEDURE — 84300 ASSAY OF URINE SODIUM: CPT | Performed by: INTERNAL MEDICINE

## 2023-12-01 PROCEDURE — 80048 BASIC METABOLIC PNL TOTAL CA: CPT | Performed by: PSYCHIATRY & NEUROLOGY

## 2023-12-01 PROCEDURE — 83935 ASSAY OF URINE OSMOLALITY: CPT | Performed by: INTERNAL MEDICINE

## 2023-12-01 PROCEDURE — 99254 IP/OBS CNSLTJ NEW/EST MOD 60: CPT | Performed by: INTERNAL MEDICINE

## 2023-12-01 RX ORDER — POTASSIUM CHLORIDE 750 MG/1
40 CAPSULE, EXTENDED RELEASE ORAL DAILY
Status: DISCONTINUED | OUTPATIENT
Start: 2023-12-01 | End: 2023-12-02

## 2023-12-01 RX ORDER — ONDANSETRON 4 MG/1
4 TABLET, ORALLY DISINTEGRATING ORAL EVERY 6 HOURS PRN
Status: DISCONTINUED | OUTPATIENT
Start: 2023-12-01 | End: 2023-12-04 | Stop reason: HOSPADM

## 2023-12-01 RX ORDER — CALCIUM CARBONATE 500 MG/1
2 TABLET, CHEWABLE ORAL 3 TIMES DAILY PRN
Status: DISCONTINUED | OUTPATIENT
Start: 2023-12-01 | End: 2023-12-04 | Stop reason: HOSPADM

## 2023-12-01 RX ADMIN — LISINOPRIL 20 MG: 20 TABLET ORAL at 08:11

## 2023-12-01 RX ADMIN — ONDANSETRON 4 MG: 4 TABLET, ORALLY DISINTEGRATING ORAL at 11:03

## 2023-12-01 RX ADMIN — MIRTAZAPINE 15 MG: 15 TABLET, FILM COATED ORAL at 20:35

## 2023-12-01 RX ADMIN — VORTIOXETINE 10 MG: 10 TABLET, FILM COATED ORAL at 08:10

## 2023-12-01 RX ADMIN — HYDROCHLOROTHIAZIDE 12.5 MG: 12.5 TABLET ORAL at 08:10

## 2023-12-01 RX ADMIN — PANTOPRAZOLE SODIUM 40 MG: 40 TABLET, DELAYED RELEASE ORAL at 08:11

## 2023-12-01 RX ADMIN — POTASSIUM CHLORIDE 40 MEQ: 10 CAPSULE, COATED, EXTENDED RELEASE ORAL at 14:41

## 2023-12-01 RX ADMIN — AMLODIPINE BESYLATE 5 MG: 5 TABLET ORAL at 08:11

## 2023-12-01 NOTE — PLAN OF CARE
"Goal Outcome Evaluation:  Plan of Care Reviewed With: patient  Patient Agreement with Plan of Care: agrees     Progress: improving.  Patient has been awake, up and sitting in room, cooperative and makes needs known.  Patient has been withdrawn to room, but comes out to nursing station for needs and requests.  Patient reports she is feeling better, and quantifies this as feeling less sad, being able to get some sleep, and unwind.  Patient denies feelings depressed rating it at 0/10, states has moderate anxiety at 4/10, denies hopelessness, stating \"There is always some hope.\"  Patient spoke about her family stressors and negative family dynamics this shift.  Pt. States her 21 year old daughter lives with her and her , and she has ADHD, so does stay withdrawn to herself a lot other than going to work, and her  is retired , so she feels they are both difficult to have effective communication with and don't contribute to household chores enough.  Patient states there has also been some conflict between her and her spouse regarding her brother getting out of residential to [facility] and live with them, as she would rather he not come. Encouraged patient to seek alternatives to anger, use coping skills to tolerate stressors, and try and identify triggers for conflicts before they are out of control.  Patient was receptive to emotional support and education. Patient denies S/I, H/I and CFS. Patient did refuse ordered pm medication, but requested her PRN xanax at bedtime.  Patient provided with sterile eye pad dressing for her missing eye area and this was self applied.  Patient has been able to sleep this shift.  Safe environment provided.             "

## 2023-12-01 NOTE — NURSING NOTE
"Patient refused scheduled pm Mirtazapine, stating she has taken it before and couldn't tolerate it.  Patient states she has experienced blurred vision, and \"bad thoughts\".  Patient encouraged to discuss her medications with the doctor tomorrow and she verbalized understanding.  "

## 2023-12-01 NOTE — PROGRESS NOTES
[facility]     Psychiatric Progress Note    Patient Name: Anni Pickard  : 1980  MRN: [id number]  Primary Care Physician:  Provider, No Known  Date of admission: 2023    Subjective   Subjective     Patient seen and chart reviewed, discussed with staff.    Chief Complaint: Depression, psychosis      HPI:     Staff reports the patient slept well, has been compliant with medications.  Reported anxiety somewhat and depression as a 0.    Patient's initial 72-hour hold is up today.  Patient was very stressful social situation.   and daughter have taken an EPO and she is not allowed to go home.  She has nowhere to go and is willing to sign in for further treatment and stabilization.    Patient reports that she did not sleep well last night, did not receive a sleep aid.  We will continue to monitor sleep and may add Elavil or Vistaril for sleep.  She reports increased sleep latency as well as multiple awakenings throughout the night.    Patient complains of nausea this morning and will add ondansetron as needed.  Patient had been receiving Trintellix and took a dose of 10 mg this morning.  She may be having some nausea due to the Trintellix and this should subside in the next 1 to 2 days.  He has not had any emesis.    She has been compliant with taking mirtazapine.    She had 1 dose of as needed alprazolam yesterday.    She is calm and cooperative.  There is no psychomotor restlessness or agitation.  There is been no evidence of psychosis.  She does remain regressed and isolative to her room.  She is a little difficult to engage but participates in interview.    Discussed labs with the patient and reported her sodium and potassium are just below normal we will continue to monitor these levels.  A1c is greater than 6 and it appears that she has a previous undiagnosed diabetes type 2 disorder.  Will have diabetic educator see and change diet to consistent carb.  May consider addition of  "metformin, or possible med consult.  She will need follow-up care at discharge.    Pressure well-controlled    Patient with no agitation or combativeness.  She has not had any outbursts.  She has been compliant with treatment medications.  She has been calm and pleasant.  Symptoms reported on the MIW have not been evident here she appears to be stabilizing well.      Objective   Objective     Vitals:   Temp:  [97.5 °F (36.4 °C)-98.1 °F (36.7 °C)] 97.5 °F (36.4 °C)  Heart Rate:  [108-120] 120  Resp:  [16-18] 18  BP: (108-109)/(79-88) 108/88          Mental Status Exam:      Appearance:   Well-developed, well-nourished, calm, cooperative, engaging, no restlessness or agitation  Reliability:   Good  Eye Contact:   Good  Concentration/Focus:    Attentive to the interview  Behaviors:    No restlessness or agitation  Memory :    Sensorium intact, no deficits  Speech:    Normal rate and volume  Language:   Appropriate, relevant  Mood :    \"Tired\"  Affect:    Congruent with stated mood and constricted  Thought process:    Goal directed, linear, no thought disorganization noted  Thought Content:    Denies suicidal or homicidal ideation, denies hallucinations and none evident  Insight:   Improving  Judgement:    Intact      Result Review    Result Review:  I have personally reviewed the results from the time of this admission to 12/1/2023 11:16 EST and agree with these findings:  [x]  Laboratory  []  Microbiology  []  Radiology  []  EKG/Telemetry   []  Cardiology/Vascular   []  Pathology  []  Old records  []  Other:  Most notable findings include: Elevated A1c, electrolytes low    Lab Results (last 24 hours)       Procedure Component Value Units Date/Time    Hemoglobin A1c [[id number]]  (Abnormal) Collected: 12/01/23 0527    Specimen: Blood from Hand, Left Updated: 12/01/23 1100     Hemoglobin A1C 6.10 %     Narrative:      Hemoglobin A1C Ranges:    Increased Risk for Diabetes  5.7% to 6.4%  Diabetes                     >= " 6.5%  Diabetic Goal                < 7.0%    Basic Metabolic Panel [[id number]]  (Abnormal) Collected: 12/01/23 0527    Specimen: Blood from Hand, Left Updated: 12/01/23 0624     Glucose 154 mg/dL      BUN 11 mg/dL      Creatinine 0.84 mg/dL      Sodium 135 mmol/L      Potassium 3.4 mmol/L      Chloride 97 mmol/L      CO2 21.0 mmol/L      Calcium 10.7 mg/dL      BUN/Creatinine Ratio 13.1     Anion Gap 17.0 mmol/L      eGFR 88.6 mL/min/1.73     Narrative:      GFR Normal >60  Chronic Kidney Disease <60  Kidney Failure <15                  Medications:   amLODIPine, 5 mg, Oral, Q24H   And  lisinopril, 20 mg, Oral, Q24H  hydroCHLOROthiazide, 12.5 mg, Oral, Daily  mirtazapine, 15 mg, Oral, Nightly  nicotine, 1 patch, Transdermal, Q24H  pantoprazole, 40 mg, Oral, Daily  Vortioxetine HBr, 10 mg, Oral, Daily With Breakfast          Assessment / Plan       Active Hospital Problems:  Active Hospital Problems    Diagnosis     **Post traumatic stress disorder (PTSD)     Type 2 diabetes mellitus     Hypokalemia     Hyponatremia     Psychosis     Esophageal reflux     Insomnia     Blindness of right eye     Essential hypertension        Plan:     Monitor electrolytes including sodium and potassium  Appears to have new onset diabetes and will get a diabetic educator and change diet to consist of carb  Will discuss with medicine about possible consult or initiating metformin versus outpatient follow-up  Patient willing to sign in for treatment and will drop the MIW  Zofran for nausea, suspect possibly due to Trintellix and we will monitor.  She would alleviate on its own  Consider Elavil for insomnia if he continues to be a problem  Work on mood stabilization and abatement of any suicidal ideation or psychosis.  Work on appropriate safety plan  Continue supportive therapy  Patient to engage in all group and individual treatment modalities available on the unit  Obtain collateral information if possible  Titrate medications as  clinically indicated  Work on appropriate disposition follow-up including referrals to substance abuse treatment if indicated      Disposition:  I expect patient to be discharged 2 to 3 days.    Part of this note may be an electronic transcription/translation of spoken language to printed text using the Dragon dictation system.         Electronically signed by Duke Navas MD, 12/01/23, 11:16 AM EST.

## 2023-12-01 NOTE — PLAN OF CARE
Goal Outcome Evaluation:  Plan of Care Reviewed With: patient  Patient Agreement with Plan of Care: agrees    Patient has been awake and alert , during most of shift, able to make needs  known, clear speech , pt denies SI/HI and denies A/V/H, pt rates anxiety 1/10 and denies depression,  pt describes mood as good, cooperative, pleasant. Pt is medication compliant, no s/s of distress at this time.K+ 3.4. replacement PO given, see eMAR.

## 2023-12-01 NOTE — NURSING NOTE
Email dated 12/01/23/0703 from patient's spouse forwarded to me at 0319. Followed up with patient's care team. No further action required.

## 2023-12-01 NOTE — CONSULTS
[facility]   Hospitalist Consult Note  Date: 2023   Patient Name: Anni Pickard  : 1980  MRN: [id number]  Primary Care Physician:  Provider, No Known  Referring Physician: Duke Navas MD  Date of admission: 2023    Subjective   Subjective     Reason for Consult/ Chief Complaint:   Medical consult for hyponatremia, hypokalemia, A1c of 6.1    HPI:  Anni Pickard is a 43 y.o. female with a medical history of anxiety, depression, GERD, hypertension, kidney disease.    Patient is currently in [facility] on an MIW.  Medicine was consulted given the lab abnormalities.  On presentation patient with a sodium 131, potassium 3.3 chloride 94.  Patient's bicarb 20 with an anion gap of 16.9.  Patient's glucose elevated 168 with an elevated alk phos of 149.  An A1c was checked for persistent hyperglycemia returned at 6.1.  Discussion with patient she drinks between 5 and 10 Cokes a day.  Diabetic educator was also consulted.  On arrival UA without ketones, cloudy with trace leukocytes.  Patient's UDS positive for benzos and THC.  At home patient is on a thiazide for hypertension.  Patient has been on this medication for some time.  Patient states since Thanksgiving she has not been sleeping not eating or drinking well.  Meeting with patient, only complaint is reflux symptoms.  Currently on Protonix we will add Tums.  Additional studies have been ordered ordering urine sodium and osmole.  However currently suspecting thiazide is culprit combined with the patient's poor nutrition    Personal History     Past Medical History:  Past Medical History:   Diagnosis Date    Alcoholism     Anxiety     Depression     GERD (gastroesophageal reflux disease)     Hypertension     Kidney disease     Legally blind in right eye, as defined in [place]     Seizures        Past Surgical History:  Past Surgical History:   Procedure Laterality Date    CYSTOSCOPY W/ URETEROSCOPY      EYE ENUCLEATION Right     LIPOMA  EXCISION      URETERAL STENT INSERTION      URETEROSCOPY         Family History:   Family History   Problem Relation Age of Onset    Stroke Mother     Cancer Mother     Ovarian cancer Mother     Multiple sclerosis Father     Cancer Maternal Grandmother     Heart attack Maternal Grandmother     Cancer Paternal Grandmother        Social History:   Social History     Socioeconomic History    Marital status:    Tobacco Use    Smoking status: Every Day     Packs/day: 1.00     Years: 24.00     Additional pack years: 0.00     Total pack years: 24.00     Types: Cigarettes    Smokeless tobacco: Never   Vaping Use    Vaping Use: Never used   Substance and Sexual Activity    Alcohol use: Not Currently    Drug use: Never    Sexual activity: Defer       Home Medications:  ALPRAZolam, amLODIPine-benazepril, amitriptyline, gabapentin, hydroCHLOROthiazide, pantoprazole, and zolpidem CR    Allergies:  Allergies   Allergen Reactions    Seroquel [Quetiapine] Other (See Comments)     Pt said she gets heart palpitations from this medication       Review of Systems   All systems were reviewed and negative except for: GERD    Objective    Objective     Vitals:   Temp:  [97.5 °F (36.4 °C)-98.1 °F (36.7 °C)] 97.5 °F (36.4 °C)  Heart Rate:  [108-120] 120  Resp:  [16-18] 18  BP: (108-109)/(79-88) 108/88    Physical Exam:   Constitutional: Awake, alert, no acute distress   Eyes: Right eye patch, left eye pupil normal and reactive   HENT: NCAT, mucous membranes moist   Neck: Supple, no thyromegaly, no lymphadenopathy, trachea midline   Respiratory: Clear to auscultation bilaterally, nonlabored respirations    Cardiovascular: RRR, no murmurs, rubs, or gallops, palpable pedal pulses bilaterally   Gastrointestinal: Positive bowel sounds, soft, nontender, nondistended   Musculoskeletal: No bilateral ankle edema, no clubbing or cyanosis to extremities   Neurologic: Oriented x 3, strength symmetric in all extremities, Cranial Nerves grossly  intact to confrontation, speech clear    Result Review    Result Review:  I have personally reviewed the results from the time of this admission to 12/1/2023 14:59 EST and agree with these findings:  []  Laboratory  []  Microbiology  []  Radiology  []  EKG/Telemetry   []  Cardiology/Vascular   []  Pathology  []  Old records  []  Other:    Assessment & Plan   Assessment / Plan     Assessment/Plan:  Hyponatremia, euvolemic  Hypokalemia  New diagnosis prediabetes A1c is 6.1  Anion gap  Hypertension  GERD    Plan:  Holding patient's thiazide, suspecting a combination of poor hydration, poor nutrition and thiazide has resulted in hyponatremia.  Since arriving in the hospital her sodium has improved from 1 31-1 35.  Providing supplementation of potassium today, currently 3.4.  Ordering urine sodium and urine osmole for further evaluation.  Will continue patient's amlodipine and lisinopril, monitor blood pressure for the need to increase now that we are holding her thiazide.  TSH checked, within normal limits    Patient complaining of GERD, currently on Protonix adding Tums.    Patient's A1c is 6.1, states she drinks between 5 and 10 sodas a day.  Suspect if patient eliminates this source of simple sugars A1c would improve.  Sugars have not been elevated enough to require sliding scale insulin at this time, however we will continue to monitor.  Psychiatry consulted diabetic educator as well, this will be an excellent source of additional information.  Diet has been updated to diabetic    Will obtain repeat labs in the morning including a CMP, alk phos 149 on arrival.  Ordering magnesium in the morning, potassium has been low since arrival on [facility] [place].    DVT prophylaxis:  Mechanical DVT prophylaxis orders are present.    CODE STATUS:    Code Status (Patient has no pulse and is not breathing): CPR (Attempt to Resuscitate)  Medical Interventions (Patient has pulse or is breathing): Full Support      Electronically signed  by Duke Valdez MD, 12/01/23, 2:59 PM EST.

## 2023-12-02 LAB
ALBUMIN SERPL-MCNC: 4.4 G/DL (ref 3.5–5.2)
ALBUMIN/GLOB SERPL: 1.3 G/DL
ALP SERPL-CCNC: 142 U/L (ref 39–117)
ALT SERPL W P-5'-P-CCNC: 38 U/L (ref 1–33)
ANION GAP SERPL CALCULATED.3IONS-SCNC: 15.8 MMOL/L (ref 5–15)
AST SERPL-CCNC: 28 U/L (ref 1–32)
BILIRUB SERPL-MCNC: 0.9 MG/DL (ref 0–1.2)
BUN SERPL-MCNC: 17 MG/DL (ref 6–20)
BUN/CREAT SERPL: 14 (ref 7–25)
CALCIUM SPEC-SCNC: 10.4 MG/DL (ref 8.6–10.5)
CHLORIDE SERPL-SCNC: 97 MMOL/L (ref 98–107)
CO2 SERPL-SCNC: 20.2 MMOL/L (ref 22–29)
CREAT SERPL-MCNC: 1.21 MG/DL (ref 0.57–1)
D-LACTATE SERPL-SCNC: 1.5 MMOL/L (ref 0.5–2)
EGFRCR SERPLBLD CKD-EPI 2021: 57.1 ML/MIN/1.73
GLOBULIN UR ELPH-MCNC: 3.5 GM/DL
GLUCOSE SERPL-MCNC: 179 MG/DL (ref 65–99)
MAGNESIUM SERPL-MCNC: 1.7 MG/DL (ref 1.6–2.6)
OSMOLALITY UR: 373 MOSM/KG
POTASSIUM SERPL-SCNC: 3.8 MMOL/L (ref 3.5–5.2)
PROT SERPL-MCNC: 7.9 G/DL (ref 6–8.5)
SODIUM SERPL-SCNC: 133 MMOL/L (ref 136–145)

## 2023-12-02 PROCEDURE — 80053 COMPREHEN METABOLIC PANEL: CPT | Performed by: INTERNAL MEDICINE

## 2023-12-02 PROCEDURE — 63710000001 ONDANSETRON ODT 4 MG TABLET DISPERSIBLE: Performed by: PSYCHIATRY & NEUROLOGY

## 2023-12-02 PROCEDURE — 99232 SBSQ HOSP IP/OBS MODERATE 35: CPT | Performed by: INTERNAL MEDICINE

## 2023-12-02 PROCEDURE — 83605 ASSAY OF LACTIC ACID: CPT | Performed by: INTERNAL MEDICINE

## 2023-12-02 PROCEDURE — 83735 ASSAY OF MAGNESIUM: CPT | Performed by: INTERNAL MEDICINE

## 2023-12-02 RX ADMIN — MIRTAZAPINE 15 MG: 15 TABLET, FILM COATED ORAL at 20:48

## 2023-12-02 RX ADMIN — ONDANSETRON 4 MG: 4 TABLET, ORALLY DISINTEGRATING ORAL at 09:25

## 2023-12-02 RX ADMIN — LISINOPRIL 20 MG: 20 TABLET ORAL at 08:49

## 2023-12-02 RX ADMIN — PANTOPRAZOLE SODIUM 40 MG: 40 TABLET, DELAYED RELEASE ORAL at 08:49

## 2023-12-02 RX ADMIN — AMLODIPINE BESYLATE 5 MG: 5 TABLET ORAL at 08:50

## 2023-12-02 RX ADMIN — POTASSIUM CHLORIDE 40 MEQ: 10 CAPSULE, COATED, EXTENDED RELEASE ORAL at 09:23

## 2023-12-02 RX ADMIN — VORTIOXETINE 10 MG: 10 TABLET, FILM COATED ORAL at 08:49

## 2023-12-02 NOTE — PLAN OF CARE
Goal Outcome Evaluation:  Plan of Care Reviewed With: patient  Patient Agreement with Plan of Care: agrees     Progress: improving     Patient has been awake and alert, clear speech, poor eye contact, cooperative, pleasant mood, withdrawn to room, able to make needs known, denies SI/HI and denies AVH. Pt K+ is 3.8 today, K+ replacement is completed. Patient is tachycardic at times (120's), pt has had some nausea off and off throughout day, see eMAR for Nausea meds given. Pt has has decreased appetite.  Pt changed eye patch, Pt rates anxiety 2/10 and denies depression. No s/s of distress at this time, contract for safety and able to make needs known. Walking stick within reach.

## 2023-12-02 NOTE — PROGRESS NOTES
[facility]     Psychiatric Progress Note    Patient Name: Anni Pickard  : 1980  MRN: [id number]  Primary Care Physician:  Provider, No Known  Date of admission: 2023    Subjective   Subjective     Patient seen and chart reviewed, discussed with staff.    Chief Complaint: I am nauseous otherwise OK       HPI:     Pt. Stated she feels nauseous this morning.  She had her breakfast and tolerated well.  She expressed she feels anxious about her discharge where would she go.  She has no other place except her  and daughter who have taken an EPO on her.  I discussed with her in detail about her situation and SW intervention for disposition.  She signed voluntary.  She is compliant with medications. Nausea may be due to medication or increased anxiety.      Objective   Objective     Vitals:   Temp:  [97.5 °F (36.4 °C)-97.7 °F (36.5 °C)] 97.5 °F (36.4 °C)  Heart Rate:  [110-137] 120  Resp:  [18] 18  BP: (118-127)/(73-94) 127/73          Mental Status Exam:      Appearance:   Calm and cooperative  Reliability:   fair  Eye Contact:   fair  Concentration/Focus:    improved  Behaviors:    cooperative  Memory :    improved  Speech:    clear and coherent  Language:   normal  Mood :    anxious  Affect:    mood congruent  Thought process:    linear  Thought Content:    denies Avh.  Denies Si/hi  Insight:   improved  Judgement:    improved      Result Review    Result Review:  I have personally reviewed the results from the time of this admission to 2023 15:15 EST and agree with these findings:  []  Laboratory  []  Microbiology  []  Radiology  []  EKG/Telemetry   []  Cardiology/Vascular   []  Pathology  []  Old records  []  Other:  Most notable findings include:     Lab Results (last 24 hours)       Procedure Component Value Units Date/Time    Comprehensive Metabolic Panel [[id number]]  (Abnormal) Collected: 23    Specimen: Blood from Hand, Left Updated: 23     Glucose 179  mg/dL      BUN 17 mg/dL      Creatinine 1.21 mg/dL      Sodium 133 mmol/L      Potassium 3.8 mmol/L      Chloride 97 mmol/L      CO2 20.2 mmol/L      Calcium 10.4 mg/dL      Total Protein 7.9 g/dL      Albumin 4.4 g/dL      ALT (SGPT) 38 U/L      AST (SGOT) 28 U/L      Alkaline Phosphatase 142 U/L      Total Bilirubin 0.9 mg/dL      Globulin 3.5 gm/dL      A/G Ratio 1.3 g/dL      BUN/Creatinine Ratio 14.0     Anion Gap 15.8 mmol/L      eGFR 57.1 mL/min/1.73     Narrative:      GFR Normal >60  Chronic Kidney Disease <60  Kidney Failure <15      Magnesium [[id number]]  (Normal) Collected: 12/02/23 0917    Specimen: Blood from Hand, Left Updated: 12/02/23 0951     Magnesium 1.7 mg/dL     Lactic Acid, Plasma [[id number]]  (Normal) Collected: 12/02/23 0917    Specimen: Blood from Hand, Left Updated: 12/02/23 0941     Lactate 1.5 mmol/L     Osmolality, Urine - Urine, Clean Catch [[id number]] Collected: 12/01/23 1516    Specimen: Urine, Clean Catch Updated: 12/02/23 0043     Osmolality, Urine 373 mOsm/kg     Narrative:      Osmo Normal Reference Ranges:    Random:  mOsm/kg H2O, depending on fluid intake.  Random: >850 mOsm/kg H20, after 12 hour fluid restriction.    24 Hour: 300-900 mOsm/kg H2O.    Sodium, Urine, Random - Urine, Clean Catch [[id number]] Collected: 12/01/23 1516    Specimen: Urine, Clean Catch Updated: 12/01/23 2324     Sodium, Urine 35 mmol/L     Narrative:      Reference intervals for random urine have not been established.  Clinical usage is dependent upon physician's interpretation in combination with other laboratory tests.                   Medications:   amLODIPine, 5 mg, Oral, Q24H   And  lisinopril, 20 mg, Oral, Q24H  mirtazapine, 15 mg, Oral, Nightly  nicotine, 1 patch, Transdermal, Q24H  pantoprazole, 40 mg, Oral, Daily  Vortioxetine HBr, 10 mg, Oral, Daily With Breakfast          Assessment / Plan       Active Hospital Problems:  Active Hospital Problems    Diagnosis     **Post traumatic  stress disorder (PTSD)     Type 2 diabetes mellitus     Hypokalemia     Hyponatremia     Psychosis     Esophageal reflux     Insomnia     Blindness of right eye     Essential hypertension        Plan:     Continue current treatment plan.  Monitor nausea.Sodium is still low.  Pottasium level is normal   Work on mood stabilization and abatement of any suicidal ideation or psychosis.  Work on appropriate safety plan  Continue supportive therapy  Patient to engage in all group and individual treatment modalities available on the unit  Obtain collateral information if possible  Titrate medications as clinically indicated  Work on appropriate disposition follow-up including referrals to substance abuse treatment if indicated      Disposition:  I expect patient to be discharged 2-3 days.    Part of this note may be an electronic transcription/translation of spoken language to printed text using the Dragon dictation system.         Electronically signed by Maude Og MD, 12/02/23, 3:15 PM EST.

## 2023-12-02 NOTE — PLAN OF CARE
Goal Outcome Evaluation:  Plan of Care Reviewed With: patient  Patient Agreement with Plan of Care: agrees             Pt is alert and oriented and able to make needs known.  Pt denies SI or HI.  Pt denies a/v/h.  Pt has been compliant with meds on shift.  Pt has been withdrawn to room.  Pt has snack.  NO s/s of acute distress at this time.

## 2023-12-02 NOTE — PROGRESS NOTES
[facility]   Hospitalist Progress Note  Date: 2023  Patient Name: Anni Pickard  : 1980  MRN: [id number]  Date of admission: 2023      Subjective   Subjective     Chief Complaint:   Medical consult for hyponatremia, hypokalemia, A1c of 6.1    Summary:   Anni Pickard is a 43 y.o. female with a medical history of anxiety, depression, GERD, hypertension, kidney disease.     Patient is currently in [facility] on an MIW.  Medicine was consulted given the lab abnormalities.  On presentation patient with a sodium 131, potassium 3.3 chloride 94.  Patient's bicarb 20 with an anion gap of 16.9.  Patient's glucose elevated 168 with an elevated alk phos of 149.  An A1c was checked for persistent hyperglycemia returned at 6.1.  Discussion with patient she drinks between 5 and 10 Cokes a day.  Diabetic educator was also consulted.  On arrival UA without ketones, cloudy with trace leukocytes.  Patient's UDS positive for benzos and THC.  At home patient is on a thiazide for hypertension.  Patient has been on this medication for some time.  Patient states since  she has not been sleeping not eating or drinking well.     Interval Followup:   Today is patient's first day without hydrochlorothiazide.  Patient's creatinine slightly up over the past 24 hours.  Encouraged appropriate hydration today.  Patient's heart rate documented as accelerated at 120, on rounds patient's heart rate checked closer to 100.  Will continue to monitor vital signs.  Patient is not on beta-blocker at home.  Unable to find consistent records of office visits, indicated for tachycardia is baseline for patient    Objective   Objective     Vitals:   Temp:  [97.5 °F (36.4 °C)-97.7 °F (36.5 °C)] 97.5 °F (36.4 °C)  Heart Rate:  [110-137] 120  Resp:  [18] 18  BP: (118-127)/(73-94) 127/73  Physical Exam               Constitutional: Awake, alert, no acute distress              Eyes: Right eye patch, left eye pupil normal  and reactive              HENT: NCAT, mucous membranes moist              Neck: Supple, no thyromegaly, no lymphadenopathy, trachea midline              Respiratory: Clear to auscultation bilaterally, nonlabored respirations               Cardiovascular: RRR, no murmurs, rubs, or gallops, palpable pedal pulses bilaterally              Gastrointestinal: Positive bowel sounds, soft, nontender, nondistended              Musculoskeletal: No bilateral ankle edema, no clubbing or cyanosis to extremities              Neurologic: Oriented x 3, strength symmetric in all extremities, Cranial Nerves grossly intact to confrontation, speech clear    Result Review    Result Review:  I have personally reviewed the results from 12/2/2023 and agree with these findings:  []  Laboratory  []  Microbiology  []  Radiology  []  EKG/Telemetry   []  Cardiology/Vascular   []  Pathology  []  Old records  []  Other:    Assessment & Plan   Assessment / Plan     Assessment/Plan:  Hyponatremia, euvolemic  Hypokalemia  New diagnosis prediabetes A1c is 6.1  Anion gap  Hypertension  GERD     Plan:  Continue to hold thiazide.  Creatinine slightly up today.  Will encourage appropriate hydration today.  Sodium remained stable low at this time however today is first day without thiazide.  Repeating labs in the morning to monitor renal function as well as sodium levels.     Patient complaining of GERD, currently on Protonix adding Tums.     Patient's A1c is 6.1, states she drinks between 5 and 10 sodas a day.  Suspect if patient eliminates this source of simple sugars A1c would improve.  Sugars have not been elevated enough to require sliding scale insulin at this time, however we will continue to monitor.  Psychiatry consulted diabetic educator as well, this will be an excellent source of additional information.  Diet has been updated to diabetic     Discussed plan with RN.    DVT prophylaxis:  Mechanical DVT prophylaxis orders are present.    CODE  STATUS:   Code Status (Patient has no pulse and is not breathing): CPR (Attempt to Resuscitate)  Medical Interventions (Patient has pulse or is breathing): Full Support

## 2023-12-03 LAB
ANION GAP SERPL CALCULATED.3IONS-SCNC: 14.9 MMOL/L (ref 5–15)
BUN SERPL-MCNC: 18 MG/DL (ref 6–20)
BUN/CREAT SERPL: 18 (ref 7–25)
CALCIUM SPEC-SCNC: 10.3 MG/DL (ref 8.6–10.5)
CHLORIDE SERPL-SCNC: 96 MMOL/L (ref 98–107)
CO2 SERPL-SCNC: 21.1 MMOL/L (ref 22–29)
CREAT SERPL-MCNC: 1 MG/DL (ref 0.57–1)
EGFRCR SERPLBLD CKD-EPI 2021: 71.8 ML/MIN/1.73
GLUCOSE SERPL-MCNC: 122 MG/DL (ref 65–99)
MAGNESIUM SERPL-MCNC: 1.9 MG/DL (ref 1.6–2.6)
POTASSIUM SERPL-SCNC: 4 MMOL/L (ref 3.5–5.2)
SODIUM SERPL-SCNC: 132 MMOL/L (ref 136–145)

## 2023-12-03 PROCEDURE — 83735 ASSAY OF MAGNESIUM: CPT | Performed by: INTERNAL MEDICINE

## 2023-12-03 PROCEDURE — 80048 BASIC METABOLIC PNL TOTAL CA: CPT | Performed by: INTERNAL MEDICINE

## 2023-12-03 RX ADMIN — LISINOPRIL 20 MG: 20 TABLET ORAL at 08:49

## 2023-12-03 RX ADMIN — PANTOPRAZOLE SODIUM 40 MG: 40 TABLET, DELAYED RELEASE ORAL at 08:49

## 2023-12-03 RX ADMIN — AMLODIPINE BESYLATE 5 MG: 5 TABLET ORAL at 08:49

## 2023-12-03 NOTE — PLAN OF CARE
Goal Outcome Evaluation:  Plan of Care Reviewed With: patient  Patient Agreement with Plan of Care: agrees         Pt is alert and oriented and able to make needs known.  Pt denies SI or HI.  Pt denies a/v/h.  Pt has been compliant with all meds and assessments on shift.  Pt remains withdrawn to room and refused night time snack.  Pt reports continuing to feel tired and was encouraged to discuss this with provider.  Pt refused labs this am and lab was encouraged to try again after breakfast as pt has allowed this x 2 days.  No s/s of acute distress observed at this time.

## 2023-12-03 NOTE — PLAN OF CARE
"Goal Outcome Evaluation:  Plan of Care Reviewed With: patient  Patient Agreement with Plan of Care: agrees     Progress: improving    Patient is awake and alert, able to make needs known, clear speech, contracts for safety, pt has been withdrawn to room, medication compliant, pleasant , describes mood as \" ehh\", pt denies SI/HI and denies A/VH, pt rates anxiety 2 /10 and denies any depression. Pt has changed eye patch dressing today, pt has not complained of nausea today, no s/s of distress at time.            "

## 2023-12-03 NOTE — PROGRESS NOTES
[facility]     Psychiatric Progress Note    Patient Name: Anni Pickard  : 1980  MRN: [id number]  Primary Care Physician:  Provider, No Known  Date of admission: 2023    Subjective   Subjective     Patient seen and chart reviewed, discussed with staff.    Chief Complaint: I am good except sweating a lot      HPI:     Patient has been calm and cooperative she usually withdrawn to her room she has some anxiety and depression but today she rates 0 out of 10.  She has been compliant with the medication.  Upon my evaluation patient stated she is doing fine except that sweating a lot.  Patient stated she is not depressed some anxiety that where would she go once she is discharged from the hospital she denies being suicidal or homicidal she has been taking her meds.  She still stated that 1 medication she is not accepting because of nausea it causes.  Patient's sodium level is again lowered after today's lab needs to be addressed.      Objective   Objective     Vitals:   Temp:  [98.2 °F (36.8 °C)-98.9 °F (37.2 °C)] 98.9 °F (37.2 °C)  Heart Rate:  [] 115  Resp:  [18] 18  BP: ()/(76-87) 105/87          Mental Status Exam:      Appearance:   Pleasant in appearance calm and cooperative  Reliability:   Fair  Eye Contact:   Intermittent  Concentration/Focus:    Intact  Behaviors:    Pleasant  Memory :    Improved in all 3 spheres  Speech:    Clear and coherent  Language:   Normal  Mood :    Fine not anxious or depressed sometimes little anxiety for her disposition  Affect:    Euthymic  Thought process:    Linear goal-directed  Thought Content:    Denies paranoia auditory visual hallucination denies being suicidal or homicidal  Insight:   Fair  Judgement:    Fair      Result Review    Result Review:  I have personally reviewed the results from the time of this admission to 12/3/2023 10:33 EST and agree with these findings:  []  Laboratory  []  Microbiology  []  Radiology  []  EKG/Telemetry   []   Cardiology/Vascular   []  Pathology  []  Old records  []  Other:  Most notable findings include:     Lab Results (last 24 hours)       Procedure Component Value Units Date/Time    Magnesium [[id number]]  (Normal) Collected: 12/03/23 0755    Specimen: Blood from Arm, Left Updated: 12/03/23 0839     Magnesium 1.9 mg/dL     Basic Metabolic Panel [[id number]]  (Abnormal) Collected: 12/03/23 0755    Specimen: Blood from Arm, Left Updated: 12/03/23 0839     Glucose 122 mg/dL      BUN 18 mg/dL      Creatinine 1.00 mg/dL      Sodium 132 mmol/L      Potassium 4.0 mmol/L      Chloride 96 mmol/L      CO2 21.1 mmol/L      Calcium 10.3 mg/dL      BUN/Creatinine Ratio 18.0     Anion Gap 14.9 mmol/L      eGFR 71.8 mL/min/1.73     Narrative:      GFR Normal >60  Chronic Kidney Disease <60  Kidney Failure <15                  Medications:   mirtazapine, 15 mg, Oral, Nightly  nicotine, 1 patch, Transdermal, Q24H  pantoprazole, 40 mg, Oral, Daily  Vortioxetine HBr, 10 mg, Oral, Daily With Breakfast          Assessment / Plan       Active Hospital Problems:  Active Hospital Problems    Diagnosis     **Post traumatic stress disorder (PTSD)     Type 2 diabetes mellitus     Hypokalemia     Hyponatremia     Psychosis     Esophageal reflux     Insomnia     Blindness of right eye     Essential hypertension        Plan:     Continue current medication but patient is refusing one medication which may be worried to try 1 medication which is what you vortioxetine 10 mg daily with breakfast she stated it causes nausea.  Her sodium levels are still low today the levels are 132 which is 1 point less than yesterday.  It needs to be addressed.  Name may be calling hospitalist to come and evaluate for dropping sodium levels.  Potassium levels are within normal range.  Work on mood stabilization and abatement of any suicidal ideation or psychosis.  Work on appropriate safety plan  Continue supportive therapy  Patient to engage in all group and individual  treatment modalities available on the unit  Obtain collateral information if possible  Titrate medications as clinically indicated  Work on appropriate disposition follow-up including referrals to substance abuse treatment if indicated      Disposition:  I expect patient to be discharged 1 to 2 days.    Part of this note may be an electronic transcription/translation of spoken language to printed text using the Dragon dictation system.         Electronically signed by Maude Og MD, 12/03/23, 10:33 AM EST.

## 2023-12-04 VITALS
WEIGHT: 177.25 LBS | DIASTOLIC BLOOD PRESSURE: 92 MMHG | TEMPERATURE: 98.1 F | SYSTOLIC BLOOD PRESSURE: 117 MMHG | OXYGEN SATURATION: 96 % | HEIGHT: 65 IN | BODY MASS INDEX: 29.53 KG/M2 | RESPIRATION RATE: 18 BRPM | HEART RATE: 109 BPM

## 2023-12-04 PROBLEM — E87.6 HYPOKALEMIA: Status: RESOLVED | Noted: 2023-11-30 | Resolved: 2023-12-04

## 2023-12-04 RX ORDER — MIRTAZAPINE 15 MG/1
30 TABLET, FILM COATED ORAL NIGHTLY
Qty: 30 TABLET | Refills: 1 | Status: SHIPPED | OUTPATIENT
Start: 2023-12-04

## 2023-12-04 RX ADMIN — ALPRAZOLAM 1 MG: 1 TABLET ORAL at 04:07

## 2023-12-04 NOTE — SIGNIFICANT NOTE
"   12/04/23 0929   Individual Counseling   Topic Individual session to discuss patient discharge as well as information received by the court   Patient Response \" I know this case will be dismissed by 2 today\"     Therapist met with patient this morning to discuss court hearing that will be held today. patient was very irritable during session states that she knows emergency protective order will be dismissed by 2 PM today.  Patient also says that \"we are not playing this today\" States That We Have \"Poked Her, kept her here, had our little meanings about her\" therapist explained that courts were contacted this morning and the protection order will not be dismissed until the  files it in the courts computer system and courts recommend not to make contact until later today or tomorrow.  Therapist explained to the patient that this was for her wellbeing so she did not make contact with her spouse/daughter prior to the dismissal of the case and to protect her so she had no further legalities involving this case.  Patient was very dismissive of therapist she did not want to acknowledge the importance of the information provided.  She states that she knows she can call by 2 PM she is advised otherwise and to make sure that the case is actually dismissed.  Patient was very difficult to engage she was irritable during the session no further information provided or discussed patient was given the information specifically provided to therapist by the courts for her to follow to avoid any further legal action  "
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
## 2023-12-04 NOTE — PLAN OF CARE
Goal Outcome Evaluation:  Plan of Care Reviewed With: patient  Patient Agreement with Plan of Care: agrees      Pt. Is irritable and wants to leave, reports she is not depressed and does not need to be here. Pt. Deniee any si/hi/avh and plans to follow up with [facility].

## 2023-12-04 NOTE — TREATMENT PLAN
Therapist in contact with [facility] this morning to obtain further information on today's emergency protective order hearing.  The  advised that hearings are held all day today starting at 9 AM.   advised the patient should not make contact with family until later this afternoon or tomorrow regarding dismissal of the case as the case will not be dismissed until the case is received and logged into the courts computer system files.

## 2023-12-04 NOTE — PLAN OF CARE
"Goal Outcome Evaluation:  Plan of Care Reviewed With: patient  Patient Agreement with Plan of Care: agrees     Progress: no change          Patient rates anxiety 2/10 and depression 0/10. Denies HI/SI. Denies AVH. Patient refused scheduled night time Remeron this shift. Patient has been withdrawn to room. Declined snack this shift. Able to make needs known. Will continue plan of care and provide a safe patient environment.     0400 Patient came to hallway during the night this shift and paced hallway twice. Would not speak to either staff remember who asked if she was okay. Upon checking on the patient in her room, patient was visibly anxious. Offered PRN Xanax per MD orders to which patient accepted. See MAR. When asked if patient wanted to discuss what is upsetting her, patient stated that she wanted to talk to \"Leon Pickard,\" her . Patient then burst into tears and began to speak about how she was going blind and \"that was not changing\" and began to mention her  and strippers. Patient would not clarify further. Patient declined to replace patch on her eye this shift.    0540 Patient refusing labs this AM  "

## 2024-02-05 ENCOUNTER — TRANSCRIBE ORDERS (OUTPATIENT)
Dept: ADMINISTRATIVE | Facility: HOSPITAL | Age: 44
End: 2024-02-05
Payer: OTHER GOVERNMENT

## 2024-02-05 DIAGNOSIS — M54.41 CHRONIC MIDLINE LOW BACK PAIN WITH BILATERAL SCIATICA: Primary | ICD-10-CM

## 2024-02-05 DIAGNOSIS — G89.29 CHRONIC MIDLINE LOW BACK PAIN WITH BILATERAL SCIATICA: Primary | ICD-10-CM

## 2024-02-05 DIAGNOSIS — M54.42 CHRONIC MIDLINE LOW BACK PAIN WITH BILATERAL SCIATICA: Primary | ICD-10-CM

## 2024-04-13 ENCOUNTER — HOSPITAL ENCOUNTER (EMERGENCY)
Facility: HOSPITAL | Age: 44
Discharge: HOME | End: 2024-04-13
Attending: EMERGENCY MEDICINE
Payer: OTHER GOVERNMENT

## 2024-04-13 ENCOUNTER — APPOINTMENT (OUTPATIENT)
Dept: CT IMAGING | Facility: HOSPITAL | Age: 44
End: 2024-04-13
Payer: OTHER GOVERNMENT

## 2024-04-13 VITALS
DIASTOLIC BLOOD PRESSURE: 82 MMHG | TEMPERATURE: 97.9 F | WEIGHT: 156.53 LBS | HEIGHT: 65 IN | BODY MASS INDEX: 26.08 KG/M2 | SYSTOLIC BLOOD PRESSURE: 114 MMHG | RESPIRATION RATE: 17 BRPM | OXYGEN SATURATION: 100 % | HEART RATE: 69 BPM

## 2024-04-13 DIAGNOSIS — G89.29 CHRONIC LEFT FLANK PAIN: Primary | ICD-10-CM

## 2024-04-13 DIAGNOSIS — R10.9 CHRONIC LEFT FLANK PAIN: Primary | ICD-10-CM

## 2024-04-13 LAB
ALBUMIN SERPL-MCNC: 4.7 G/DL (ref 3.5–5.2)
ALBUMIN/GLOB SERPL: 1.7 G/DL
ALP SERPL-CCNC: 104 U/L (ref 39–117)
ALT SERPL W P-5'-P-CCNC: 16 U/L (ref 1–33)
ANION GAP SERPL CALCULATED.3IONS-SCNC: 16.1 MMOL/L (ref 5–15)
AST SERPL-CCNC: 16 U/L (ref 1–32)
BACTERIA UR QL AUTO: ABNORMAL /HPF
BASOPHILS # BLD AUTO: 0.11 10*3/MM3 (ref 0–0.2)
BASOPHILS NFR BLD AUTO: 0.8 % (ref 0–1.5)
BILIRUB SERPL-MCNC: 0.5 MG/DL (ref 0–1.2)
BILIRUB UR QL STRIP: NEGATIVE
BUN SERPL-MCNC: 12 MG/DL (ref 6–20)
BUN/CREAT SERPL: 13.2 (ref 7–25)
CALCIUM SPEC-SCNC: 10.4 MG/DL (ref 8.6–10.5)
CHLORIDE SERPL-SCNC: 106 MMOL/L (ref 98–107)
CLARITY UR: CLEAR
CO2 SERPL-SCNC: 14.9 MMOL/L (ref 22–29)
COLOR UR: YELLOW
CREAT SERPL-MCNC: 0.91 MG/DL (ref 0.57–1)
DEPRECATED RDW RBC AUTO: 40.7 FL (ref 37–54)
EGFRCR SERPLBLD CKD-EPI 2021: 80.4 ML/MIN/1.73
EOSINOPHIL # BLD AUTO: 0.06 10*3/MM3 (ref 0–0.4)
EOSINOPHIL NFR BLD AUTO: 0.4 % (ref 0.3–6.2)
ERYTHROCYTE [DISTWIDTH] IN BLOOD BY AUTOMATED COUNT: 13.2 % (ref 12.3–15.4)
GLOBULIN UR ELPH-MCNC: 2.8 GM/DL
GLUCOSE SERPL-MCNC: 126 MG/DL (ref 65–99)
GLUCOSE UR STRIP-MCNC: NEGATIVE MG/DL
HCG INTACT+B SERPL-ACNC: <0.5 MIU/ML
HCT VFR BLD AUTO: 44.7 % (ref 34–46.6)
HGB BLD-MCNC: 16.3 G/DL (ref 12–15.9)
HGB UR QL STRIP.AUTO: NEGATIVE
HOLD SPECIMEN: NORMAL
HOLD SPECIMEN: NORMAL
HYALINE CASTS UR QL AUTO: ABNORMAL /LPF
IMM GRANULOCYTES # BLD AUTO: 0.03 10*3/MM3 (ref 0–0.05)
IMM GRANULOCYTES NFR BLD AUTO: 0.2 % (ref 0–0.5)
KETONES UR QL STRIP: NEGATIVE
LEUKOCYTE ESTERASE UR QL STRIP.AUTO: ABNORMAL
LIPASE SERPL-CCNC: 35 U/L (ref 13–60)
LYMPHOCYTES # BLD AUTO: 3.69 10*3/MM3 (ref 0.7–3.1)
LYMPHOCYTES NFR BLD AUTO: 25.5 % (ref 19.6–45.3)
MCH RBC QN AUTO: 31.2 PG (ref 26.6–33)
MCHC RBC AUTO-ENTMCNC: 36.5 G/DL (ref 31.5–35.7)
MCV RBC AUTO: 85.5 FL (ref 79–97)
MONOCYTES # BLD AUTO: 0.78 10*3/MM3 (ref 0.1–0.9)
MONOCYTES NFR BLD AUTO: 5.4 % (ref 5–12)
NEUTROPHILS NFR BLD AUTO: 67.7 % (ref 42.7–76)
NEUTROPHILS NFR BLD AUTO: 9.78 10*3/MM3 (ref 1.7–7)
NITRITE UR QL STRIP: NEGATIVE
NRBC BLD AUTO-RTO: 0 /100 WBC (ref 0–0.2)
PH UR STRIP.AUTO: 6.5 [PH] (ref 5–8)
PLATELET # BLD AUTO: 520 10*3/MM3 (ref 140–450)
PMV BLD AUTO: 9.3 FL (ref 6–12)
POTASSIUM SERPL-SCNC: 3.6 MMOL/L (ref 3.5–5.2)
PROT SERPL-MCNC: 7.5 G/DL (ref 6–8.5)
PROT UR QL STRIP: NEGATIVE
RBC # BLD AUTO: 5.23 10*6/MM3 (ref 3.77–5.28)
RBC # UR STRIP: ABNORMAL /HPF
REF LAB TEST METHOD: ABNORMAL
SODIUM SERPL-SCNC: 137 MMOL/L (ref 136–145)
SP GR UR STRIP: 1.01 (ref 1–1.03)
SQUAMOUS #/AREA URNS HPF: ABNORMAL /HPF
UROBILINOGEN UR QL STRIP: ABNORMAL
WBC # UR STRIP: ABNORMAL /HPF
WBC NRBC COR # BLD AUTO: 14.45 10*3/MM3 (ref 3.4–10.8)
WHOLE BLOOD HOLD COAG: NORMAL
WHOLE BLOOD HOLD SPECIMEN: NORMAL

## 2024-04-13 PROCEDURE — 25010000002 ONDANSETRON PER 1 MG

## 2024-04-13 PROCEDURE — 74177 CT ABD & PELVIS W/CONTRAST: CPT

## 2024-04-13 PROCEDURE — 85025 COMPLETE CBC W/AUTO DIFF WBC: CPT | Performed by: EMERGENCY MEDICINE

## 2024-04-13 PROCEDURE — 83690 ASSAY OF LIPASE: CPT | Performed by: EMERGENCY MEDICINE

## 2024-04-13 PROCEDURE — 25510000001 IOPAMIDOL PER 1 ML: Performed by: EMERGENCY MEDICINE

## 2024-04-13 PROCEDURE — 25010000002 KETOROLAC TROMETHAMINE PER 15 MG

## 2024-04-13 PROCEDURE — 99285 EMERGENCY DEPT VISIT HI MDM: CPT

## 2024-04-13 PROCEDURE — 96375 TX/PRO/DX INJ NEW DRUG ADDON: CPT

## 2024-04-13 PROCEDURE — 81001 URINALYSIS AUTO W/SCOPE: CPT | Performed by: EMERGENCY MEDICINE

## 2024-04-13 PROCEDURE — 25010000002 MORPHINE PER 10 MG: Performed by: EMERGENCY MEDICINE

## 2024-04-13 PROCEDURE — 80053 COMPREHEN METABOLIC PANEL: CPT | Performed by: EMERGENCY MEDICINE

## 2024-04-13 PROCEDURE — 84702 CHORIONIC GONADOTROPIN TEST: CPT | Performed by: EMERGENCY MEDICINE

## 2024-04-13 PROCEDURE — 96374 THER/PROPH/DIAG INJ IV PUSH: CPT

## 2024-04-13 RX ORDER — ONDANSETRON 2 MG/ML
4 INJECTION INTRAMUSCULAR; INTRAVENOUS ONCE
Status: COMPLETED | OUTPATIENT
Start: 2024-04-13 | End: 2024-04-13

## 2024-04-13 RX ORDER — MORPHINE SULFATE 2 MG/ML
2 INJECTION, SOLUTION INTRAMUSCULAR; INTRAVENOUS ONCE
Status: COMPLETED | OUTPATIENT
Start: 2024-04-13 | End: 2024-04-13

## 2024-04-13 RX ORDER — SODIUM CHLORIDE 0.9 % (FLUSH) 0.9 %
10 SYRINGE (ML) INJECTION AS NEEDED
Status: DISCONTINUED | OUTPATIENT
Start: 2024-04-13 | End: 2024-04-13 | Stop reason: HOSPADM

## 2024-04-13 RX ORDER — KETOROLAC TROMETHAMINE 30 MG/ML
30 INJECTION, SOLUTION INTRAMUSCULAR; INTRAVENOUS ONCE
Status: COMPLETED | OUTPATIENT
Start: 2024-04-13 | End: 2024-04-13

## 2024-04-13 RX ADMIN — KETOROLAC TROMETHAMINE 30 MG: 30 INJECTION, SOLUTION INTRAMUSCULAR; INTRAVENOUS at 15:50

## 2024-04-13 RX ADMIN — IOPAMIDOL 100 ML: 755 INJECTION, SOLUTION INTRAVENOUS at 16:31

## 2024-04-13 RX ADMIN — MORPHINE SULFATE 2 MG: 2 INJECTION, SOLUTION INTRAMUSCULAR; INTRAVENOUS at 16:51

## 2024-04-13 RX ADMIN — ONDANSETRON 4 MG: 2 INJECTION INTRAMUSCULAR; INTRAVENOUS at 15:07

## 2024-04-13 NOTE — ED PROVIDER NOTES
Time: 2:25 PM EDT  Date of encounter:  4/13/2024  Independent Historian/Clinical History and Information was obtained by:   Patient and Family    History is limited by: N/A    Chief Complaint: Flank pain      History of Present Illness:  Patient is a 43 y.o. year old female who presents to the emergency department for evaluation of left flank pain.  Patient states she has been experiencing this left flank pain for several months but has been worse the past 10 days.  Patient states she has aperients nausea and vomiting due to the severity of her pain.  Patient reports she had a lipoma removed in the area of her pain in the left flank, has a small surgical scar in that area, states her pain is primarily in the area of her surgical scar, pain is constant, denies urinary symptoms.      HPI    Patient Care Team  Primary Care Provider: Provider, No Known    Past Medical History:     Allergies   Allergen Reactions    Seroquel [Quetiapine] Other (See Comments)     Pt said she gets heart palpitations from this medication     Past Medical History:   Diagnosis Date    Alcoholism     Anxiety     Depression     GERD (gastroesophageal reflux disease)     Hypertension     Kidney disease     Legally blind in right eye, as defined in [place]     Seizures      Past Surgical History:   Procedure Laterality Date    CYSTOSCOPY W/ URETEROSCOPY      EYE ENUCLEATION Right     LIPOMA EXCISION      URETERAL STENT INSERTION      URETEROSCOPY       Family History   Problem Relation Age of Onset    Stroke Mother     Cancer Mother     Ovarian cancer Mother     Multiple sclerosis Father     Cancer Maternal Grandmother     Heart attack Maternal Grandmother     Cancer Paternal Grandmother        Home Medications:  Prior to Admission medications    Medication Sig Start Date End Date Taking? Authorizing Provider   mirtazapine (REMERON) 15 MG tablet Take 2 tablets by mouth Every Night. Indications: Major Depressive Disorder, PTSD 12/4/23   Duke Navas MD  "  pantoprazole (PROTONIX) 40 MG EC tablet TAKE 1 TABLET BY MOUTH DAILY 2/27/23   Valentin Ward APRN        Social History:   Social History     Tobacco Use    Smoking status: Every Day     Current packs/day: 1.00     Average packs/day: 1 pack/day for 24.0 years (24.0 ttl pk-yrs)     Types: Cigarettes    Smokeless tobacco: Never   Vaping Use    Vaping status: Never Used   Substance Use Topics    Alcohol use: Not Currently    Drug use: Never         Review of Systems:  Review of Systems   Constitutional:  Negative for fever.   HENT:  Negative for sore throat.    Eyes: Negative.    Respiratory:  Negative for cough and shortness of breath.    Cardiovascular:  Negative for chest pain.   Gastrointestinal:  Positive for nausea and vomiting. Negative for abdominal pain and diarrhea.   Genitourinary:  Positive for flank pain. Negative for difficulty urinating and dysuria.   Musculoskeletal:  Positive for back pain. Negative for neck pain.   Skin:  Negative for rash.   Allergic/Immunologic: Negative.    Neurological:  Negative for weakness, numbness and headaches.   Hematological: Negative.    Psychiatric/Behavioral: Negative.     All other systems reviewed and are negative.       Physical Exam:  /82   Pulse 69   Temp 97.9 °F (36.6 °C) (Oral)   Resp 17   Ht 165.1 cm (65\")   Wt 71 kg (156 lb 8.4 oz)   SpO2 100%   BMI 26.05 kg/m²     Physical Exam  Vitals and nursing note reviewed.   Constitutional:       General: She is not in acute distress.     Appearance: Normal appearance. She is not toxic-appearing.   HENT:      Head: Normocephalic and atraumatic.      Jaw: There is normal jaw occlusion.   Eyes:      General: Lids are normal.      Extraocular Movements: Extraocular movements intact.      Conjunctiva/sclera: Conjunctivae normal.      Pupils: Pupils are equal, round, and reactive to light.   Cardiovascular:      Rate and Rhythm: Normal rate and regular rhythm.      Pulses: Normal pulses.      Heart sounds: " Normal heart sounds.   Pulmonary:      Effort: Pulmonary effort is normal. No respiratory distress.      Breath sounds: Normal breath sounds. No wheezing or rhonchi.   Abdominal:      General: Abdomen is flat.      Palpations: Abdomen is soft.      Tenderness: There is no abdominal tenderness. There is left CVA tenderness. There is no guarding or rebound.   Musculoskeletal:         General: Normal range of motion.      Cervical back: Normal range of motion and neck supple.      Thoracic back: Tenderness present.        Back:       Right lower leg: No edema.      Left lower leg: No edema.   Skin:     General: Skin is warm and dry.   Neurological:      Mental Status: She is alert and oriented to person, place, and time. Mental status is at baseline.   Psychiatric:         Mood and Affect: Mood normal.            Procedures:  Procedures      Medical Decision Making:      Comorbidities that affect care:    Hypertension, Smoking, Substance Abuse    External Notes reviewed:    Previous Clinic Note: Patient was seen by family medicine on 1/31/2024 concerning her back pain, an MRI of the lumbar spine without contrast was ordered but has not been completed yet. and Previous Radiological Studies: Patient had x-ray of the lumbar spine on 2/5/2024 which showed no acute radiographic abnormality of the lumbar spine      The following orders were placed and all results were independently analyzed by me:  Orders Placed This Encounter   Procedures    CT Abdomen Pelvis With Contrast    [place] Draw    Comprehensive Metabolic Panel    Lipase    Urinalysis With Microscopic If Indicated (No Culture) - Urine, Clean Catch    hCG, Quantitative, Pregnancy    CBC Auto Differential    Urinalysis, Microscopic Only - Urine, Clean Catch    NPO Diet NPO Type: Strict NPO    Undress & Gown    Pulmonology (on-call MD unless specified)    Insert Peripheral IV    CBC & Differential    Green Top (Gel)    Lavender Top    Gold Top - SST    Light Blue Top        Medications Given in the Emergency Department:  Medications   sodium chloride 0.9 % flush 10 mL (has no administration in time range)   ondansetron (ZOFRAN) injection 4 mg (4 mg Intravenous Given 4/13/24 1507)   ketorolac (TORADOL) injection 30 mg (30 mg Intravenous Given 4/13/24 1550)   iopamidol (ISOVUE-370) 76 % injection 100 mL (100 mL Intravenous Given 4/13/24 1631)   morphine injection 2 mg (2 mg Intravenous Given 4/13/24 1651)        ED Course:    ED Course as of 04/13/24 1922   Sat Apr 13, 2024   1918 Went to patient's room to discuss CT findings and patient has eloped from the emergency department. [RM]      ED Course User Index  [RM] Xochitl Garza APRN       Labs:    Lab Results (last 24 hours)       Procedure Component Value Units Date/Time    CBC & Differential [[id number]]  (Abnormal) Collected: 04/13/24 1433    Specimen: Blood Updated: 04/13/24 1442    Narrative:      The following orders were created for panel order CBC & Differential.  Procedure                               Abnormality         Status                     ---------                               -----------         ------                     CBC Auto Differential[[id number]]        Abnormal            Final result                 Please view results for these tests on the individual orders.    Comprehensive Metabolic Panel [[id number]]  (Abnormal) Collected: 04/13/24 1433    Specimen: Blood Updated: 04/13/24 1504     Glucose 126 mg/dL      BUN 12 mg/dL      Creatinine 0.91 mg/dL      Sodium 137 mmol/L      Potassium 3.6 mmol/L      Comment: Slight hemolysis detected by analyzer. Result may be falsely elevated.        Chloride 106 mmol/L      CO2 14.9 mmol/L      Calcium 10.4 mg/dL      Total Protein 7.5 g/dL      Albumin 4.7 g/dL      ALT (SGPT) 16 U/L      AST (SGOT) 16 U/L      Alkaline Phosphatase 104 U/L      Total Bilirubin 0.5 mg/dL      Globulin 2.8 gm/dL      A/G Ratio 1.7 g/dL      BUN/Creatinine Ratio 13.2     Anion Gap  16.1 mmol/L      eGFR 80.4 mL/min/1.73     Narrative:      GFR Normal >60  Chronic Kidney Disease <60  Kidney Failure <15      Lipase [[id number]]  (Normal) Collected: 04/13/24 1433    Specimen: Blood Updated: 04/13/24 1504     Lipase 35 U/L     hCG, Quantitative, Pregnancy [[id number]] Collected: 04/13/24 1433    Specimen: Blood Updated: 04/13/24 1501     HCG Quantitative <0.50 mIU/mL     Narrative:      HCG Ranges by Gestational Age    Females - non-pregnant premenopausal   </= 1mIU/mL HCG  Females - postmenopausal               </= 7mIU/mL HCG    3 Weeks       5.4   -      72 mIU/mL  4 Weeks      10.2   -     708 mIU/mL  5 Weeks       217   -   8,245 mIU/mL  6 Weeks       152   -  32,177 mIU/mL  7 Weeks     4,059   - 153,767 mIU/mL  8 Weeks    31,366   - 149,094 mIU/mL  9 Weeks    59,109   - 135,901 mIU/mL  10 Weeks   44,186   - 170,409 mIU/mL  12 Weeks   27,107   - 201,615 mIU/mL  14 Weeks   24,302   -  93,646 mIU/mL  15 Weeks   12,540   -  69,747 mIU/mL  16 Weeks    8,904   -  55,332 mIU/mL  17 Weeks    8,240   -  51,793 mIU/mL  18 Weeks    9,649   -  55,271 mIU/mL      CBC Auto Differential [[id number]]  (Abnormal) Collected: 04/13/24 1433    Specimen: Blood Updated: 04/13/24 1442     WBC 14.45 10*3/mm3      RBC 5.23 10*6/mm3      Hemoglobin 16.3 g/dL      Hematocrit 44.7 %      MCV 85.5 fL      MCH 31.2 pg      MCHC 36.5 g/dL      RDW 13.2 %      RDW-SD 40.7 fl      MPV 9.3 fL      Platelets 520 10*3/mm3      Neutrophil % 67.7 %      Lymphocyte % 25.5 %      Monocyte % 5.4 %      Eosinophil % 0.4 %      Basophil % 0.8 %      Immature Grans % 0.2 %      Neutrophils, Absolute 9.78 10*3/mm3      Lymphocytes, Absolute 3.69 10*3/mm3      Monocytes, Absolute 0.78 10*3/mm3      Eosinophils, Absolute 0.06 10*3/mm3      Basophils, Absolute 0.11 10*3/mm3      Immature Grans, Absolute 0.03 10*3/mm3      nRBC 0.0 /100 WBC     Urinalysis With Microscopic If Indicated (No Culture) - Urine, Clean Catch [[id number]]  (Abnormal)  Collected: 04/13/24 1508    Specimen: Urine, Clean Catch Updated: 04/13/24 1530     Color, UA Yellow     Appearance, UA Clear     pH, UA 6.5     Specific Gravity, UA 1.007     Glucose, UA Negative     Ketones, UA Negative     Bilirubin, UA Negative     Blood, UA Negative     Protein, UA Negative     Leuk Esterase, UA Small (1+)     Nitrite, UA Negative     Urobilinogen, UA 0.2 E.U./dL    Urinalysis, Microscopic Only - Urine, Clean Catch [[id number]]  (Abnormal) Collected: 04/13/24 1508    Specimen: Urine, Clean Catch Updated: 04/13/24 1543     RBC, UA 0-2 /HPF      WBC, UA 3-5 /HPF      Bacteria, UA Trace /HPF      Squamous Epithelial Cells, UA 3-6 /HPF      Hyaline Casts, UA None Seen /LPF      Methodology Manual Light Microscopy             Imaging:    CT Abdomen Pelvis With Contrast    Result Date: 4/13/2024  CT ABDOMEN PELVIS W CONTRAST-  Date of Exam: 4/13/2024 4:16 PM  Indication: abdominal pain. Left flank pain  Comparison: CT abdomen pelvis with contrast dated 11/3/2023 and 4/28/2019  Technique: Axial CT images were obtained of the abdomen and pelvis following the uneventful intravenous administration of 100 cc of Isovue-370. Reconstructed coronal and sagittal images were also obtained. Automated exposure control and iterative construction methods were used.   Findings: There is a new 6 mm noncalcified nodule with tiny adjacent satellite nodules in a tree-in-bud configuration within the periphery of the left lower lobe (axial CT image 10 has the appearance of endobronchial infection. The liver, spleen, pancreas, adrenal glands, and kidneys are unremarkable except for a couple of 2 to 3 mm nonobstructing stones in the lower pole the right kidney. The gallbladder is present without fluid or surrounding inflammatory change. There is no biliary dilatation.The bladder is unremarkable. Solid pelvic organs are normal for her age with crenated appearing follicles on the right ovary with small amount of free fluid in  the pelvis.  There is now an appendicolith within an otherwise normal-appearing appendix the appendicolith is a change since 11/3/2023 but there our no signs of acute appendicitis. The GI tract otherwise is unremarkable. No focal bowel wall thickening or dilation. The GI tract is unremarkable. No pathologically enlarged lymph nodes. No acute inflammatory changes. No fluid collections. No free intraperitoneal air. Abdominal aorta is normal in course and caliber. No acute bony abnormality or suspicious focal osseous lesion.        Impression:  1. Development of tree-in-bud opacity in the left lower lobe since 11/3/2023 is compatible with endobronchial infection.  2. Small amount of free fluid in the posterior pelvic cul-de-sac is considered physiologic in female patients of this age. No acute intra-abdominal or intrapelvic abnormality.  3. There is now an appendicolith within the appendix but there is no CT signs of acute appendicitis. 4. There are a couple of 2 to 3 mm nonobstructing stones in the right kidney.    Electronically Signed By-Williams Esparza DO On:4/13/2024 5:58 PM         Differential Diagnosis and Discussion:    Back Pain: The patient presents with back pain. My differential diagnosis includes but is not limited to acute spinal epidural abscess, acute spinal epidural bleed, cauda equina syndrome, abdominal aortic aneurysm, aortic dissection, kidney stone, pyelonephritis, musculoskeletal back pain, spinal fracture, and osteoarthritis.   Flank Pain: Differential diagnosis includes but is not limited to kidney stones, pyelonephritis, musculoskeletal disorders, renal infarction, urinary tract infection, hydronephrosis, radiculopathy, aortic aneurysm, renal cell carcinoma.    All labs were reviewed and interpreted by me.  CT scan radiology impression was interpreted by me.    MDM     Amount and/or Complexity of Data Reviewed  Clinical lab tests: reviewed  Tests in the radiology section of CPT®: reviewed                  Patient Care Considerations:    I considered ordering antibiotics, however patient had eloped from the emergency department.  SEPSIS was considered but is NOT present in the emergency department as SIRS criteria is not present. CONSULT: I considered consulting pulmonology, however patient eloped.      Consultants/Shared Management Plan:    SHARED VISIT: I have discussed the case with my supervising physician, Dr. Palmer who states to consult with pulmonology regarding CT findings of endobronchial infection. The substantive portion of the medical decision was made by the attesting physician who made or approve the management plan and will take responsibility for the patient.  Clinical findings were discussed and ultimate disposition was made in consult with supervising physician.    Social Determinants of Health:    Patient is independent, reliable, and has access to care.       Disposition and Care Coordination:    Eloped: This patient has left the emergency department or waiting room with no communication to myself, nursing or administrative staff. There was no opportunity to discuss the patient's decision to leave, provide medical advice or discuss alternatives to. The staff has made efforts to locate patient without success.        Final diagnoses:   Chronic left flank pain        ED Disposition       ED Disposition   Eloped    Condition   --    Comment   --               This medical record created using voice recognition software.             Xochitl Garza, APRN  04/13/24 1922

## 2024-04-24 ENCOUNTER — HOSPITAL ENCOUNTER (EMERGENCY)
Facility: HOSPITAL | Age: 44
Discharge: LEFT BEFORE DISCHARGE | End: 2024-04-24
Attending: EMERGENCY MEDICINE
Payer: OTHER GOVERNMENT

## 2024-04-24 VITALS
WEIGHT: 159.39 LBS | OXYGEN SATURATION: 92 % | DIASTOLIC BLOOD PRESSURE: 60 MMHG | RESPIRATION RATE: 16 BRPM | BODY MASS INDEX: 27.21 KG/M2 | HEART RATE: 63 BPM | SYSTOLIC BLOOD PRESSURE: 99 MMHG | TEMPERATURE: 97.7 F | HEIGHT: 64 IN

## 2024-04-24 LAB
ALBUMIN SERPL-MCNC: 4.2 G/DL (ref 3.5–5.2)
ALBUMIN/GLOB SERPL: 1.8 G/DL
ALP SERPL-CCNC: 100 U/L (ref 39–117)
ALT SERPL W P-5'-P-CCNC: 42 U/L (ref 1–33)
ANION GAP SERPL CALCULATED.3IONS-SCNC: 13.8 MMOL/L (ref 5–15)
AST SERPL-CCNC: 19 U/L (ref 1–32)
BACTERIA UR QL AUTO: ABNORMAL /HPF
BASOPHILS # BLD AUTO: 0.12 10*3/MM3 (ref 0–0.2)
BASOPHILS NFR BLD AUTO: 1.3 % (ref 0–1.5)
BILIRUB SERPL-MCNC: 0.6 MG/DL (ref 0–1.2)
BILIRUB UR QL STRIP: ABNORMAL
BUN SERPL-MCNC: 10 MG/DL (ref 6–20)
BUN/CREAT SERPL: 13.7 (ref 7–25)
CALCIUM SPEC-SCNC: 9.4 MG/DL (ref 8.6–10.5)
CHLORIDE SERPL-SCNC: 106 MMOL/L (ref 98–107)
CLARITY UR: ABNORMAL
CO2 SERPL-SCNC: 21.2 MMOL/L (ref 22–29)
COLOR UR: YELLOW
CREAT SERPL-MCNC: 0.73 MG/DL (ref 0.57–1)
DEPRECATED RDW RBC AUTO: 42.6 FL (ref 37–54)
EGFRCR SERPLBLD CKD-EPI 2021: 104.1 ML/MIN/1.73
EOSINOPHIL # BLD AUTO: 0.17 10*3/MM3 (ref 0–0.4)
EOSINOPHIL NFR BLD AUTO: 1.8 % (ref 0.3–6.2)
ERYTHROCYTE [DISTWIDTH] IN BLOOD BY AUTOMATED COUNT: 13.1 % (ref 12.3–15.4)
GLOBULIN UR ELPH-MCNC: 2.4 GM/DL
GLUCOSE SERPL-MCNC: 113 MG/DL (ref 65–99)
GLUCOSE UR STRIP-MCNC: NEGATIVE MG/DL
HCG INTACT+B SERPL-ACNC: <0.5 MIU/ML
HCT VFR BLD AUTO: 40.5 % (ref 34–46.6)
HGB BLD-MCNC: 14.2 G/DL (ref 12–15.9)
HGB UR QL STRIP.AUTO: ABNORMAL
HOLD SPECIMEN: NORMAL
HOLD SPECIMEN: NORMAL
HYALINE CASTS UR QL AUTO: ABNORMAL /LPF
IMM GRANULOCYTES # BLD AUTO: 0.02 10*3/MM3 (ref 0–0.05)
IMM GRANULOCYTES NFR BLD AUTO: 0.2 % (ref 0–0.5)
KETONES UR QL STRIP: ABNORMAL
LEUKOCYTE ESTERASE UR QL STRIP.AUTO: ABNORMAL
LIPASE SERPL-CCNC: 29 U/L (ref 13–60)
LYMPHOCYTES # BLD AUTO: 3.86 10*3/MM3 (ref 0.7–3.1)
LYMPHOCYTES NFR BLD AUTO: 40.5 % (ref 19.6–45.3)
MCH RBC QN AUTO: 31.2 PG (ref 26.6–33)
MCHC RBC AUTO-ENTMCNC: 35.1 G/DL (ref 31.5–35.7)
MCV RBC AUTO: 89 FL (ref 79–97)
MONOCYTES # BLD AUTO: 0.69 10*3/MM3 (ref 0.1–0.9)
MONOCYTES NFR BLD AUTO: 7.2 % (ref 5–12)
NEUTROPHILS NFR BLD AUTO: 4.68 10*3/MM3 (ref 1.7–7)
NEUTROPHILS NFR BLD AUTO: 49 % (ref 42.7–76)
NITRITE UR QL STRIP: NEGATIVE
NRBC BLD AUTO-RTO: 0 /100 WBC (ref 0–0.2)
PH UR STRIP.AUTO: 6.5 [PH] (ref 5–8)
PLATELET # BLD AUTO: 422 10*3/MM3 (ref 140–450)
PMV BLD AUTO: 9.4 FL (ref 6–12)
POTASSIUM SERPL-SCNC: 3.1 MMOL/L (ref 3.5–5.2)
PROT SERPL-MCNC: 6.6 G/DL (ref 6–8.5)
PROT UR QL STRIP: ABNORMAL
RBC # BLD AUTO: 4.55 10*6/MM3 (ref 3.77–5.28)
RBC # UR STRIP: ABNORMAL /HPF
REF LAB TEST METHOD: ABNORMAL
SODIUM SERPL-SCNC: 141 MMOL/L (ref 136–145)
SP GR UR STRIP: 1.01 (ref 1–1.03)
SQUAMOUS #/AREA URNS HPF: ABNORMAL /HPF
UROBILINOGEN UR QL STRIP: ABNORMAL
WBC # UR STRIP: ABNORMAL /HPF
WBC NRBC COR # BLD AUTO: 9.54 10*3/MM3 (ref 3.4–10.8)
WHOLE BLOOD HOLD COAG: NORMAL
WHOLE BLOOD HOLD SPECIMEN: NORMAL

## 2024-04-24 PROCEDURE — 36415 COLL VENOUS BLD VENIPUNCTURE: CPT

## 2024-04-24 PROCEDURE — 99211 OFF/OP EST MAY X REQ PHY/QHP: CPT

## 2024-04-24 PROCEDURE — 80053 COMPREHEN METABOLIC PANEL: CPT

## 2024-04-24 PROCEDURE — 84702 CHORIONIC GONADOTROPIN TEST: CPT

## 2024-04-24 PROCEDURE — 83690 ASSAY OF LIPASE: CPT

## 2024-04-24 PROCEDURE — 81001 URINALYSIS AUTO W/SCOPE: CPT

## 2024-04-24 PROCEDURE — 85025 COMPLETE CBC W/AUTO DIFF WBC: CPT

## 2024-04-24 RX ORDER — GABAPENTIN 300 MG/1
1 CAPSULE ORAL 3 TIMES DAILY
COMMUNITY
Start: 2024-04-04

## 2024-04-24 RX ORDER — ZOLPIDEM TARTRATE 6.25 MG/1
TABLET, FILM COATED, EXTENDED RELEASE ORAL
COMMUNITY
Start: 2024-04-04

## 2024-04-24 RX ORDER — PANTOPRAZOLE SODIUM 40 MG/1
1 TABLET, DELAYED RELEASE ORAL DAILY
COMMUNITY
Start: 2024-04-13

## 2024-04-24 RX ORDER — IBUPROFEN 600 MG/1
TABLET ORAL
COMMUNITY
Start: 2023-11-03

## 2024-04-24 RX ORDER — SODIUM CHLORIDE 0.9 % (FLUSH) 0.9 %
10 SYRINGE (ML) INJECTION AS NEEDED
Status: DISCONTINUED | OUTPATIENT
Start: 2024-04-24 | End: 2024-04-24 | Stop reason: HOSPADM

## 2024-04-24 RX ORDER — ROPINIROLE 0.25 MG/1
0.25 TABLET, FILM COATED ORAL DAILY
COMMUNITY
Start: 2024-01-31 | End: 2024-04-30

## 2024-04-24 RX ORDER — ALPRAZOLAM 1 MG/1
1 TABLET ORAL 3 TIMES DAILY
COMMUNITY
Start: 2024-04-04

## 2024-04-24 RX ORDER — AMLODIPINE BESYLATE AND BENAZEPRIL HYDROCHLORIDE 10; 20 MG/1; MG/1
1 CAPSULE ORAL DAILY
COMMUNITY
Start: 2024-01-31

## 2024-05-01 ENCOUNTER — APPOINTMENT (OUTPATIENT)
Dept: CT IMAGING | Facility: HOSPITAL | Age: 44
End: 2024-05-01
Payer: OTHER GOVERNMENT

## 2024-05-01 ENCOUNTER — HOSPITAL ENCOUNTER (EMERGENCY)
Facility: HOSPITAL | Age: 44
Discharge: HOME | End: 2024-05-01
Attending: EMERGENCY MEDICINE
Payer: OTHER GOVERNMENT

## 2024-05-01 VITALS
RESPIRATION RATE: 18 BRPM | HEART RATE: 83 BPM | TEMPERATURE: 97.8 F | HEIGHT: 55 IN | WEIGHT: 154.76 LBS | BODY MASS INDEX: 35.82 KG/M2 | SYSTOLIC BLOOD PRESSURE: 119 MMHG | OXYGEN SATURATION: 100 % | DIASTOLIC BLOOD PRESSURE: 91 MMHG

## 2024-05-01 DIAGNOSIS — R91.1 LUNG NODULE: ICD-10-CM

## 2024-05-01 DIAGNOSIS — N28.89 RENAL MASS: Primary | ICD-10-CM

## 2024-05-01 DIAGNOSIS — R19.7 INTRACTABLE DIARRHEA: ICD-10-CM

## 2024-05-01 LAB
ALBUMIN SERPL-MCNC: 4.2 G/DL (ref 3.5–5.2)
ALBUMIN/GLOB SERPL: 1.7 G/DL
ALP SERPL-CCNC: 114 U/L (ref 39–117)
ALT SERPL W P-5'-P-CCNC: 28 U/L (ref 1–33)
ANION GAP SERPL CALCULATED.3IONS-SCNC: 14.9 MMOL/L (ref 5–15)
AST SERPL-CCNC: 28 U/L (ref 1–32)
BASOPHILS # BLD MANUAL: 0.29 10*3/MM3 (ref 0–0.2)
BASOPHILS NFR BLD MANUAL: 2 % (ref 0–1.5)
BILIRUB SERPL-MCNC: 0.4 MG/DL (ref 0–1.2)
BILIRUB UR QL STRIP: NEGATIVE
BUN SERPL-MCNC: 7 MG/DL (ref 6–20)
BUN/CREAT SERPL: 9.7 (ref 7–25)
CALCIUM SPEC-SCNC: 9.6 MG/DL (ref 8.6–10.5)
CHLORIDE SERPL-SCNC: 106 MMOL/L (ref 98–107)
CLARITY UR: CLEAR
CO2 SERPL-SCNC: 18.1 MMOL/L (ref 22–29)
COLOR UR: YELLOW
CREAT SERPL-MCNC: 0.72 MG/DL (ref 0.57–1)
DEPRECATED RDW RBC AUTO: 41.2 FL (ref 37–54)
EGFRCR SERPLBLD CKD-EPI 2021: 105.9 ML/MIN/1.73
EOSINOPHIL # BLD MANUAL: 0.14 10*3/MM3 (ref 0–0.4)
EOSINOPHIL NFR BLD MANUAL: 1 % (ref 0.3–6.2)
ERYTHROCYTE [DISTWIDTH] IN BLOOD BY AUTOMATED COUNT: 13 % (ref 12.3–15.4)
GLOBULIN UR ELPH-MCNC: 2.5 GM/DL
GLUCOSE SERPL-MCNC: 121 MG/DL (ref 65–99)
GLUCOSE UR STRIP-MCNC: NEGATIVE MG/DL
HCG INTACT+B SERPL-ACNC: <0.5 MIU/ML
HCT VFR BLD AUTO: 44 % (ref 34–46.6)
HGB BLD-MCNC: 15.8 G/DL (ref 12–15.9)
HGB UR QL STRIP.AUTO: NEGATIVE
HOLD SPECIMEN: NORMAL
HOLD SPECIMEN: NORMAL
KETONES UR QL STRIP: NEGATIVE
LEUKOCYTE ESTERASE UR QL STRIP.AUTO: NEGATIVE
LIPASE SERPL-CCNC: 25 U/L (ref 13–60)
LYMPHOCYTES # BLD MANUAL: 2.74 10*3/MM3 (ref 0.7–3.1)
LYMPHOCYTES NFR BLD MANUAL: 6 % (ref 5–12)
MCH RBC QN AUTO: 31.5 PG (ref 26.6–33)
MCHC RBC AUTO-ENTMCNC: 35.9 G/DL (ref 31.5–35.7)
MCV RBC AUTO: 87.6 FL (ref 79–97)
MONOCYTES # BLD: 0.86 10*3/MM3 (ref 0.1–0.9)
NEUTROPHILS # BLD AUTO: 10.37 10*3/MM3 (ref 1.7–7)
NEUTROPHILS NFR BLD MANUAL: 72 % (ref 42.7–76)
NITRITE UR QL STRIP: NEGATIVE
PH UR STRIP.AUTO: 7 [PH] (ref 5–8)
PLATELET # BLD AUTO: 470 10*3/MM3 (ref 140–450)
PMV BLD AUTO: 9.7 FL (ref 6–12)
POTASSIUM SERPL-SCNC: 3.6 MMOL/L (ref 3.5–5.2)
PROT SERPL-MCNC: 6.7 G/DL (ref 6–8.5)
PROT UR QL STRIP: ABNORMAL
RBC # BLD AUTO: 5.02 10*6/MM3 (ref 3.77–5.28)
RBC MORPH BLD: NORMAL
SMALL PLATELETS BLD QL SMEAR: ADEQUATE
SODIUM SERPL-SCNC: 139 MMOL/L (ref 136–145)
SP GR UR STRIP: 1.02 (ref 1–1.03)
UROBILINOGEN UR QL STRIP: ABNORMAL
VARIANT LYMPHS NFR BLD MANUAL: 19 % (ref 19.6–45.3)
WBC MORPH BLD: NORMAL
WBC NRBC COR # BLD AUTO: 14.4 10*3/MM3 (ref 3.4–10.8)
WHOLE BLOOD HOLD COAG: NORMAL
WHOLE BLOOD HOLD SPECIMEN: NORMAL

## 2024-05-01 PROCEDURE — 84702 CHORIONIC GONADOTROPIN TEST: CPT

## 2024-05-01 PROCEDURE — 74177 CT ABD & PELVIS W/CONTRAST: CPT

## 2024-05-01 PROCEDURE — 80053 COMPREHEN METABOLIC PANEL: CPT

## 2024-05-01 PROCEDURE — 85007 BL SMEAR W/DIFF WBC COUNT: CPT

## 2024-05-01 PROCEDURE — 99285 EMERGENCY DEPT VISIT HI MDM: CPT

## 2024-05-01 PROCEDURE — 83690 ASSAY OF LIPASE: CPT

## 2024-05-01 PROCEDURE — 36415 COLL VENOUS BLD VENIPUNCTURE: CPT

## 2024-05-01 PROCEDURE — 25510000001 IOPAMIDOL PER 1 ML

## 2024-05-01 PROCEDURE — 85025 COMPLETE CBC W/AUTO DIFF WBC: CPT

## 2024-05-01 PROCEDURE — 81003 URINALYSIS AUTO W/O SCOPE: CPT

## 2024-05-01 RX ORDER — SODIUM CHLORIDE 0.9 % (FLUSH) 0.9 %
10 SYRINGE (ML) INJECTION AS NEEDED
Status: DISCONTINUED | OUTPATIENT
Start: 2024-05-01 | End: 2024-05-01 | Stop reason: HOSPADM

## 2024-05-01 RX ORDER — ONDANSETRON 4 MG/1
4 TABLET, ORALLY DISINTEGRATING ORAL EVERY 8 HOURS PRN
Qty: 15 TABLET | Refills: 0 | Status: SHIPPED | OUTPATIENT
Start: 2024-05-01 | End: 2024-05-06

## 2024-05-01 RX ORDER — AMITRIPTYLINE HYDROCHLORIDE 25 MG/1
25 TABLET, FILM COATED ORAL DAILY
COMMUNITY
Start: 2024-01-31

## 2024-05-01 RX ORDER — OLANZAPINE 5 MG/1
5 TABLET ORAL
COMMUNITY

## 2024-05-01 RX ORDER — DULOXETIN HYDROCHLORIDE 20 MG/1
20 CAPSULE, DELAYED RELEASE ORAL DAILY
COMMUNITY

## 2024-05-01 RX ORDER — KETOROLAC TROMETHAMINE 15 MG/ML
15 INJECTION, SOLUTION INTRAMUSCULAR; INTRAVENOUS ONCE
Status: DISCONTINUED | OUTPATIENT
Start: 2024-05-01 | End: 2024-05-01 | Stop reason: HOSPADM

## 2024-05-01 RX ADMIN — IOPAMIDOL 80 ML: 755 INJECTION, SOLUTION INTRAVENOUS at 18:30

## 2024-05-01 NOTE — ED PROVIDER NOTES
Time: 3:17 PM EDT  Date of encounter:  5/1/2024  Independent Historian/Clinical History and Information was obtained by:   Family    History is limited by: N/A    Chief Complaint: Abdominal pain      History of Present Illness:  Patient is a 44 y.o. year old female who presents to the emergency department for evaluation of abdominal pain with associated nausea/vomiting/diarrhea that began 1 month ago.  Patient denies blood in her stool.  Patient denies urinary symptoms.  Patient denies fever.        HPI    Patient Care Team  Primary Care Provider: Provider, No Known    Past Medical History:     Allergies   Allergen Reactions    Erythromycin Hives    Seroquel [Quetiapine] Other (See Comments)     Pt said she gets heart palpitations from this medication     Past Medical History:   Diagnosis Date    Alcoholism     Anxiety     Depression     GERD (gastroesophageal reflux disease)     Hypertension     Kidney disease     Legally blind in right eye, as defined in [place]     Seizures     Pt  cannot recall last seizure- no current seizure medications     Past Surgical History:   Procedure Laterality Date    CYSTOSCOPY W/ URETEROSCOPY      EYE ENUCLEATION Right     LIPOMA EXCISION      URETERAL STENT INSERTION      URETEROSCOPY       Family History   Problem Relation Age of Onset    Stroke Mother     Cancer Mother     Ovarian cancer Mother     Multiple sclerosis Father     Cancer Maternal Grandmother     Heart attack Maternal Grandmother     Cancer Paternal Grandmother        Home Medications:  Prior to Admission medications    Medication Sig Start Date End Date Taking? Authorizing Provider   ALPRAZolam (XANAX) 1 MG tablet Take 1 tablet by mouth 3 times a day. 4/4/24   Chapis Watkins MD   amLODIPine-benazepril (LOTREL) 10-20 MG per capsule Take 1 capsule by mouth Daily. 1/31/24   Chapis Watkins MD   gabapentin (NEURONTIN) 300 MG capsule Take 1 capsule by mouth 3 times a day. 4/4/24   Chapis Watkins MD  "  ibuprofen (ADVIL,MOTRIN) 600 MG tablet TAKE 1 TABLET BY MOUTH EVERY 8 HOURS FOR UP TO 7 DAYS AS NEEDED FOR MILD PAIN 11/3/23   Chapis Watkins MD   pantoprazole (PROTONIX) 40 MG EC tablet Take 1 tablet by mouth Daily. 4/13/24   Chapis Watkins MD   rOPINIRole (REQUIP) 0.25 MG tablet Take 1 tablet by mouth Daily. 1/31/24 4/30/24  Chapis Watkins MD   zolpidem CR (AMBIEN CR) 6.25 MG CR tablet take 1 tablet by mouth every night as needed 4/4/24   Chapis Watkins MD        Social History:   Social History     Tobacco Use    Smoking status: Every Day     Current packs/day: 1.00     Average packs/day: 1 pack/day for 24.0 years (24.0 ttl pk-yrs)     Types: Cigarettes    Smokeless tobacco: Never   Vaping Use    Vaping status: Never Used   Substance Use Topics    Alcohol use: Not Currently    Drug use: Never         Review of Systems:  Review of Systems   Constitutional:  Negative for chills and fever.   HENT:  Negative for congestion, rhinorrhea and sore throat.    Eyes:  Negative for pain and visual disturbance.   Respiratory:  Negative for apnea, cough, chest tightness and shortness of breath.    Cardiovascular:  Negative for chest pain and palpitations.   Gastrointestinal:  Positive for abdominal pain, diarrhea, nausea and vomiting.   Genitourinary:  Negative for difficulty urinating and dysuria.   Musculoskeletal:  Negative for joint swelling and myalgias.   Skin:  Negative for color change.   Neurological:  Negative for seizures and headaches.   Psychiatric/Behavioral: Negative.     All other systems reviewed and are negative.       Physical Exam:  /91 (BP Location: Left arm, Patient Position: Lying)   Pulse 83   Temp 97.8 °F (36.6 °C)   Resp 18   Ht 139.7 cm (55\")   Wt 70.2 kg (154 lb 12.2 oz)   LMP 04/01/2024 (Approximate)   SpO2 100%   BMI 35.97 kg/m²     Physical Exam  Vitals and nursing note reviewed.   Constitutional:       General: She is not in acute distress.     " Appearance: Normal appearance. She is not toxic-appearing.   HENT:      Head: Normocephalic and atraumatic.      Jaw: There is normal jaw occlusion.      Mouth/Throat:      Mouth: Mucous membranes are moist.   Eyes:      General: Lids are normal.      Extraocular Movements: Extraocular movements intact.      Conjunctiva/sclera: Conjunctivae normal.      Pupils: Pupils are equal, round, and reactive to light.   Cardiovascular:      Rate and Rhythm: Normal rate and regular rhythm.      Pulses: Normal pulses.      Heart sounds: Normal heart sounds.   Pulmonary:      Effort: Pulmonary effort is normal. No respiratory distress.      Breath sounds: Normal breath sounds. No wheezing or rhonchi.   Abdominal:      General: Abdomen is flat. There is no distension.      Palpations: Abdomen is soft.      Tenderness: There is generalized abdominal tenderness. There is no guarding or rebound.   Musculoskeletal:         General: Normal range of motion.      Cervical back: Normal range of motion and neck supple.      Right lower leg: No edema.      Left lower leg: No edema.   Skin:     General: Skin is warm and dry.   Neurological:      General: No focal deficit present.      Mental Status: She is alert and oriented to person, place, and time. Mental status is at baseline.   Psychiatric:         Mood and Affect: Mood normal.         Behavior: Behavior normal.                  Procedures:  Procedures      Medical Decision Making:      Comorbidities that affect care:    Hypertension    External Notes reviewed:          The following orders were placed and all results were independently analyzed by me:  Orders Placed This Encounter   Procedures    CT Abdomen Pelvis With Contrast    [place] Draw    Comprehensive Metabolic Panel    Lipase    Urinalysis With Microscopic If Indicated (No Culture) - Urine, Clean Catch    hCG, Quantitative, Pregnancy    CBC Auto Differential    Manual Differential    Ambulatory Referral to Gastroenterology     Ambulatory Referral to Pulmonology    Ambulatory Referral to Urology    NPO Diet NPO Type: Strict NPO    Undress & Gown    Insert Peripheral IV    CBC & Differential    Green Top (Gel)    Lavender Top    Gold Top - SST    Light Blue Top       Medications Given in the Emergency Department:  Medications   sodium chloride 0.9 % flush 10 mL (has no administration in time range)   ketorolac (TORADOL) injection 15 mg (15 mg Intravenous Not Given 5/1/24 1951)   iopamidol (ISOVUE-370) 76 % injection 100 mL (80 mL Intravenous Given 5/1/24 1830)        ED Course:    ED Course as of 05/01/24 1956   Wed May 01, 2024   1516 -- PROVIDER IN TRIAGE NOTE ---    The patient was evaluated by Era shelton in triage. Orders were placed and the patient is currently awaiting disposition.    [CB]      ED Course User Index  [CB] Era Lindquist APRN       Labs:    Lab Results (last 24 hours)       Procedure Component Value Units Date/Time    Urinalysis With Microscopic If Indicated (No Culture) - Urine, Clean Catch [[id number]]  (Abnormal) Collected: 05/01/24 1530    Specimen: Urine, Clean Catch Updated: 05/01/24 1618     Color, UA Yellow     Appearance, UA Clear     pH, UA 7.0     Specific Gravity, UA 1.016     Glucose, UA Negative     Ketones, UA Negative     Bilirubin, UA Negative     Blood, UA Negative     Protein, UA Trace     Leuk Esterase, UA Negative     Nitrite, UA Negative     Urobilinogen, UA 0.2 E.U./dL    Narrative:      Urine microscopic not indicated.    CBC & Differential [[id number]]  (Abnormal) Collected: 05/01/24 1534    Specimen: Blood from Arm, Right Updated: 05/01/24 1612    Narrative:      The following orders were created for panel order CBC & Differential.  Procedure                               Abnormality         Status                     ---------                               -----------         ------                     CBC Auto Differential[[id number]]        Abnormal            Final result                Scan Slide[[id number]]                                                                    Please view results for these tests on the individual orders.    Comprehensive Metabolic Panel [[id number]]  (Abnormal) Collected: 05/01/24 1534    Specimen: Blood from Arm, Right Updated: 05/01/24 1602     Glucose 121 mg/dL      BUN 7 mg/dL      Creatinine 0.72 mg/dL      Sodium 139 mmol/L      Potassium 3.6 mmol/L      Chloride 106 mmol/L      CO2 18.1 mmol/L      Calcium 9.6 mg/dL      Total Protein 6.7 g/dL      Albumin 4.2 g/dL      ALT (SGPT) 28 U/L      AST (SGOT) 28 U/L      Alkaline Phosphatase 114 U/L      Total Bilirubin 0.4 mg/dL      Globulin 2.5 gm/dL      A/G Ratio 1.7 g/dL      BUN/Creatinine Ratio 9.7     Anion Gap 14.9 mmol/L      eGFR 105.9 mL/min/1.73     Narrative:      GFR Normal >60  Chronic Kidney Disease <60  Kidney Failure <15      Lipase [[id number]]  (Normal) Collected: 05/01/24 1534    Specimen: Blood from Arm, Right Updated: 05/01/24 1602     Lipase 25 U/L     hCG, Quantitative, Pregnancy [[id number]] Collected: 05/01/24 1534    Specimen: Blood from Arm, Right Updated: 05/01/24 1603     HCG Quantitative <0.50 mIU/mL     Narrative:      HCG Ranges by Gestational Age    Females - non-pregnant premenopausal   </= 1mIU/mL HCG  Females - postmenopausal               </= 7mIU/mL HCG    3 Weeks       5.4   -      72 mIU/mL  4 Weeks      10.2   -     708 mIU/mL  5 Weeks       217   -   8,245 mIU/mL  6 Weeks       152   -  32,177 mIU/mL  7 Weeks     4,059   - 153,767 mIU/mL  8 Weeks    31,366   - 149,094 mIU/mL  9 Weeks    59,109   - 135,901 mIU/mL  10 Weeks   44,186   - 170,409 mIU/mL  12 Weeks   27,107   - 201,615 mIU/mL  14 Weeks   24,302   -  93,646 mIU/mL  15 Weeks   12,540   -  69,747 mIU/mL  16 Weeks    8,904   -  55,332 mIU/mL  17 Weeks    8,240   -  51,793 mIU/mL  18 Weeks    9,649   -  55,271 mIU/mL      CBC Auto Differential [[id number]]  (Abnormal) Collected: 05/01/24 1534    Specimen: Blood  from Arm, Right Updated: 05/01/24 1612     WBC 14.40 10*3/mm3      RBC 5.02 10*6/mm3      Hemoglobin 15.8 g/dL      Hematocrit 44.0 %      MCV 87.6 fL      MCH 31.5 pg      MCHC 35.9 g/dL      RDW 13.0 %      RDW-SD 41.2 fl      MPV 9.7 fL      Platelets 470 10*3/mm3     Manual Differential [[id number]]  (Abnormal) Collected: 05/01/24 1534    Specimen: Blood from Arm, Right Updated: 05/01/24 1612     Neutrophil % 72.0 %      Lymphocyte % 19.0 %      Monocyte % 6.0 %      Eosinophil % 1.0 %      Basophil % 2.0 %      Neutrophils Absolute 10.37 10*3/mm3      Lymphocytes Absolute 2.74 10*3/mm3      Monocytes Absolute 0.86 10*3/mm3      Eosinophils Absolute 0.14 10*3/mm3      Basophils Absolute 0.29 10*3/mm3      RBC Morphology Normal     WBC Morphology Normal     Platelet Estimate Adequate             Imaging:    CT Abdomen Pelvis With Contrast    Result Date: 5/1/2024  CT ABDOMEN PELVIS W CONTRAST-  Date of Exam: 5/1/2024 6:15 PM  Indication: Abdominal pain, kidney stone history.  Comparison: Prior examinations dating back to 4/28/2019.  Technique: Axial CT images were obtained of the abdomen and pelvis following the uneventful intravenous administration of nonionic contrast. Reconstructed coronal and sagittal images were also obtained. Automated exposure control and iterative construction methods were used.   Findings: ABDOMEN: There is nodular infiltrate with satellite nodules laterally in the left lower lobe stable compared to the most recent examination. There is a 9 mm noncalcified nodule in the left lower lobe image 4. This appears new compared to the prior examinations. There is fatty infiltration of the liver. The spleen, pancreas and adrenal glands are normal. There are no inflammatory changes around the gallbladder. There there is a subcentimeter cyst in the upper pole the left kidney. There appears to be new 6 mm hypodense mass laterally in the lower pole the right kidney.. There are a few nonobstructing  right renal calcifications measuring up to 3 mm. No evidence of hydronephrosis.  PELVIS: No evidence of bowel obstruction, perforation or abscess. The uterus and adnexa have a normal CT appearance. There is a physiologic amount of pelvic free fluid. No CT evidence of colitis. The appendix and terminal ileum are normal. The abdominal aorta has a normal caliber. No acute osseous abnormalities are identified. 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      Impression:  1. 9 mm indeterminate noncalcified left lower lobe nodule. Suggest a routine follow-up CT scan of the chest.  2. 6 mm indeterminate hypodense mass laterally in the lower pole of the right kidney not a simple cyst by CT criteria. Recommend a routine follow-up MRI of the abdomen with and without intravenous gadolinium.  3. Fatty infiltration of the liver.    Electronically Signed By-RANDY ABRAMS MD On:5/1/2024 6:54 PM         Differential Diagnosis and Discussion:    Abdominal Pain: Based on the patient's signs and symptoms, I considered abdominal aortic aneurysm, small bowel obstruction, pancreatitis, acute cholecystitis, acute appendecitis, peptic ulcer disease, gastritis, colitis, endocrine disorders, irritable bowel syndrome and other differential diagnosis an etiology of the patient's abdominal pain.    All labs were reviewed and interpreted by me.  CT scan radiology impression was interpreted by me.    MDM     Amount and/or Complexity of Data Reviewed  Clinical lab tests: reviewed  Tests in the radiology section of CPT®: reviewed       Urinalysis shows no evidence of UTI.  CBC shows evidence of leukocytosis most likely secondary to nausea/vomiting.  I will send the patient home with Zofran.  Patient denied IV fluids when offered.  CT scan shows  9 mm indeterminate noncalcified left lower lobe nodule.  I suggested a follow-up scan with PCP of chest within the next 2 weeks and this was relayed to patient.  There was also a  6 mm indeterminate hypodense  mass laterally in the lower pole of the right kidney not a simple cyst by CT criteria.  I will provide an ambulatory referral to urology for follow-up.  Due to the patient's prolonged diarrhea and GI symptoms I will provide an ambulatory referral to gastroenterology as well.  I instructed patient to return to ED the meantime she develops any new or worsening symptoms.  Patient is resting comfortably at this time and states she understands and agrees with plan of care.          Patient Care Considerations:          Consultants/Shared Management Plan:    None    Social Determinants of Health:    Patient is independent, reliable, and has access to care.       Disposition and Care Coordination:    Discharged: The patient is suitable and stable for discharge with no need for consideration of admission.    I have explained the patient´s condition, diagnoses and treatment plan based on the information available to me at this time. I have answered questions and addressed any concerns. The patient has a good  understanding of the patient´s diagnosis, condition, and treatment plan as can be expected at this point. The vital signs have been stable. The patient´s condition is stable and appropriate for discharge from the emergency department.      The patient will pursue further outpatient evaluation with the primary care physician or other designated or consulting physician as outlined in the discharge instructions. They are agreeable to this plan of care and follow-up instructions have been explained in detail. The patient has received these instructions in written format and has expressed an understanding of the discharge instructions. The patient is aware that any significant change in condition or worsening of symptoms should prompt an immediate return to this or the closest emergency department or call to 911.  I have explained discharge medications and the need for follow up with the patient/caretakers. This was also  printed in the discharge instructions. Patient was discharged with the following medications and follow up:      Medication List        New Prescriptions      ondansetron ODT 4 MG disintegrating tablet  Commonly known as: ZOFRAN-ODT  Place 1 tablet on the tongue Every 8 (Eight) Hours As Needed for Nausea for up to 5 days.            Stop      rOPINIRole 0.25 MG tablet  Commonly known as: REQUIP               Where to Get Your Medications        These medications were sent to [id number] - [place], [place] - [address] AT [facility] - [phone]  - [phone]   [address], [place] [address]      Phone: [phone]   ondansetron ODT 4 MG disintegrating tablet      Isadora Barajas MD  [address] [facility]  [address]  [place] [address]  [phone]    Call in 1 day  To schedule follow-up appointment    Ander Chand DO  [address]  [address]  [facility] [address]  [phone]    Call in 1 day  To schedule follow-up appointment    Mary Prieto MD  [address] [facility] [address]  [phone]    Call in 1 day  To schedule follow-up appointment       Final diagnoses:   Renal mass   Lung nodule   Intractable diarrhea        ED Disposition       ED Disposition   Discharge    Condition   Stable    Comment   --               This medical record created using voice recognition software.             Jimmy Thomason PA-C  05/01/24 1956

## 2024-05-09 NOTE — PROGRESS NOTES
Primary Care Provider  Caryn Munoz APRN   Referring Provider  Jimmy Thomason PA-C      Patient Complaint  Establish Care (New Patient: 9mm LLL)      Subjective          Anni Pickard presents to [facility] PULMONARY & CRITICAL CARE MEDICINE      History of Presenting Illness  Anni Pickard is a 44 y.o. female with pulmonary nodule, tobacco use ongoing here to establish care.    Ms. Pickard presents today as a new patient in our clinic, referred by ER physician for lung nodule.  She is accompanied by her .  Patient presented to the emergency department 5/1/2024 with abdominal pain.  CT abdomen and pelvis incidentally showed a 9 mm left noncalcified nodule in the left lower lobe.  Patient denies using any antibiotics or steroids for her lungs recently, denies any fevers or chills.  She has never needed to be hospitalized for her breathing.  Patient has never been diagnosed with any chronic lung disease.  Her respiratory complaints are minimal, no shortness of breath, cough, or wheezing.  Patient does not use any inhalers or respiratory medications regularly.  She is a current smoker, down to half a pack per day, 24 pack years.  Patient was also exposed to secondhand smoke by her parents.  She denies any hemoptysis.  Patient has had some weight loss in the recently, following with gastroenterology.  She has had some night sweats, though attributes them to perimenopause.  Patient has noticed some swollen lymph nodes on the left side of her neck.  She does have a family history of lung cancer on both sides of her family.  Patient does not currently work, no exposures to fumes or toxins that she knows of.  She has 2 cats and 1 dog, no birds.  Patient is able to perform ADLs without difficulty.  I have personally reviewed the review of systems, past family, social, medical and surgical histories; and agree with their findings.      Review of Systems    Review of Systems    Constitutional:  Negative for activity change, chills, fatigue, fever, unexpected weight gain and unexpected weight loss.   HENT:  Negative for congestion, ear discharge, ear pain, mouth sores, postnasal drip, rhinorrhea, sinus pressure, sore throat, swollen glands and trouble swallowing.    Eyes:  Negative for blurred vision, pain, discharge, itching and visual disturbance.   Respiratory:  Negative for apnea, cough, chest tightness, shortness of breath, wheezing and stridor.    Cardiovascular:  Negative for chest pain, palpitations and leg swelling.   Gastrointestinal:  Negative for abdominal distention, abdominal pain, constipation, diarrhea, nausea, vomiting, GERD and indigestion.   Musculoskeletal:  Negative for arthralgias, joint swelling and myalgias.   Skin:  Negative for color change.   Neurological:  Negative for dizziness, weakness, light-headedness and headache.      Sleep: Negative for Excessive daytime sleepiness  Negative for morning headaches  Negative for Snoring      Family History   Problem Relation Age of Onset    Stroke Mother     Cancer Mother     Ovarian cancer Mother     Multiple sclerosis Father     Cancer Maternal Grandmother     Heart attack Maternal Grandmother     Cancer Paternal Grandmother         Social History     Socioeconomic History    Marital status:    Tobacco Use    Smoking status: Every Day     Current packs/day: 0.50     Average packs/day: 1 pack/day for 24.4 years (24.2 ttl pk-yrs)     Types: Cigarettes     Start date: 2024    Smokeless tobacco: Never   Vaping Use    Vaping status: Never Used   Substance and Sexual Activity    Alcohol use: Not Currently    Drug use: Never    Sexual activity: Defer        Past Medical History:   Diagnosis Date    Alcoholism     Anxiety     Depression     GERD (gastroesophageal reflux disease)     Hypertension     Kidney disease     Legally blind in right eye, as defined in [place]     Seizures     Pt  cannot recall last seizure- no  "current seizure medications        Immunization History   Administered Date(s) Administered    Hepatitis B Adolescent High Risk Infant 11/04/1997    MMR 10/28/1997    PPD Test 01/24/2007    Td (TDVAX) 10/28/1997       Allergies   Allergen Reactions    Loratadine-Pseudoephedrine Er Hives    Erythromycin Hives    Seroquel [Quetiapine] Other (See Comments)     Pt said she gets heart palpitations from this medication          Current Outpatient Medications:     ALPRAZolam (XANAX) 1 MG tablet, Take 1 tablet by mouth 3 times a day., Disp: , Rfl:     dicyclomine (BENTYL) 10 MG capsule, Take 1 capsule by mouth 4 (Four) Times a Day., Disp: , Rfl:     DULoxetine (CYMBALTA) 20 MG capsule, Take 1 capsule by mouth Daily., Disp: , Rfl:     gabapentin (NEURONTIN) 300 MG capsule, Take 1 capsule by mouth 3 times a day., Disp: , Rfl:     ibuprofen (ADVIL,MOTRIN) 600 MG tablet, TAKE 1 TABLET BY MOUTH EVERY 8 HOURS FOR UP TO 7 DAYS AS NEEDED FOR MILD PAIN, Disp: , Rfl:     OLANZapine (zyPREXA) 5 MG tablet, Take 1 tablet by mouth every night at bedtime., Disp: , Rfl:     pantoprazole (PROTONIX) 40 MG EC tablet, Take 1 tablet by mouth Daily., Disp: , Rfl:     amitriptyline (ELAVIL) 25 MG tablet, Take 1 tablet by mouth Daily. (Patient not taking: Reported on 5/13/2024), Disp: , Rfl:     amLODIPine-benazepril (LOTREL) 10-20 MG per capsule, Take 1 capsule by mouth Daily. (Patient not taking: Reported on 5/13/2024), Disp: , Rfl:     zolpidem CR (AMBIEN CR) 6.25 MG CR tablet, take 1 tablet by mouth every night as needed (Patient not taking: Reported on 5/13/2024), Disp: , Rfl:      Objective     Vital Signs:   /89 (BP Location: Left arm, Patient Position: Sitting, Cuff Size: Adult)   Pulse 77   Temp 97.9 °F (36.6 °C) (Oral)   Resp 16   Ht 139.7 cm (55\")   Wt 71.4 kg (157 lb 6.4 oz)   SpO2 94% Comment: ROOM AIR  BMI 36.58 kg/m²     Physical Exam  Constitutional:       General: She is not in acute distress.     Appearance: Normal " appearance. She is obese. She is not ill-appearing.   HENT:      Right Ear: Tympanic membrane and ear canal normal.      Left Ear: Tympanic membrane and ear canal normal.      Nose: Nose normal.      Mouth/Throat:      Mouth: Mucous membranes are moist.      Pharynx: Oropharynx is clear.   Eyes:      Extraocular Movements: Extraocular movements intact.      Conjunctiva/sclera: Conjunctivae normal.      Pupils: Pupils are equal, round, and reactive to light.   Cardiovascular:      Rate and Rhythm: Normal rate and regular rhythm.      Pulses: Normal pulses.      Heart sounds: Normal heart sounds.   Pulmonary:      Effort: Pulmonary effort is normal. No respiratory distress.      Breath sounds: No stridor. Wheezing (faint) present. No rhonchi or rales.   Abdominal:      General: Bowel sounds are normal.      Palpations: Abdomen is soft.   Musculoskeletal:         General: No swelling. Normal range of motion.      Cervical back: Normal range of motion and neck supple.      Right lower leg: No edema.      Left lower leg: No edema.   Skin:     General: Skin is warm and dry.   Neurological:      General: No focal deficit present.      Mental Status: She is alert and oriented to person, place, and time.      Motor: No weakness.   Psychiatric:         Mood and Affect: Mood normal.         Behavior: Behavior normal.        Result Review :   I have personally reviewed patient's labs and images.  I also reviewed Jimmy YAN's ER note 5/3/2024.            Diagnoses and all orders for this visit:    1. Pulmonary nodule (Primary)  -     Cancel: CT Chest Without Contrast; Future  -     NM PET/CT Skull Base to Mid Thigh; Future    2. Tobacco abuse    3. Encounter for smoking cessation counseling    4. Obesity (BMI 30-39.9)       Impression and Plan    -CT abdomen and pelvis 5/3/2024 while in the ER for abdominal pain incidentally showed a 9 mm noncalcified nodule in the left lower lobe  -PET scan ordered today to further  evaluate lung nodule.  We discussed that if the lung nodule is hypermetabolic on PET scan, our recommendation would likely be to biopsy.  However, patient is adamant about not wanting a biopsy done if that is the case.  -Patient does not use any inhalers or respiratory medications regularly  -Smoking cessation counseling provided.  I spent 5 minutes today counseling patient on the risks of smoking, including throat cancer, lung cancer, COPD, heart disease and death.  -Follow-up after PET scan    Smoking status: Reviewed  Vaccination status: Patient declines all vaccines.  Patient is advised to continue to follow CDC recommendations such as social distancing wearing a mask and washing hands for at least 20 seconds.  Medications personally reviewed    Follow Up   No follow-ups on file.  Patient was given instructions and counseling regarding her condition or for health maintenance advice. Please see specific information pulled into the AVS if appropriate.

## 2024-05-13 ENCOUNTER — OFFICE VISIT (OUTPATIENT)
Dept: PULMONOLOGY | Facility: CLINIC | Age: 44
End: 2024-05-13
Payer: OTHER GOVERNMENT

## 2024-05-13 VITALS
BODY MASS INDEX: 36.43 KG/M2 | RESPIRATION RATE: 16 BRPM | TEMPERATURE: 97.9 F | HEART RATE: 77 BPM | SYSTOLIC BLOOD PRESSURE: 128 MMHG | DIASTOLIC BLOOD PRESSURE: 89 MMHG | HEIGHT: 55 IN | WEIGHT: 157.4 LBS | OXYGEN SATURATION: 94 %

## 2024-05-13 DIAGNOSIS — E66.9 OBESITY (BMI 30-39.9): ICD-10-CM

## 2024-05-13 DIAGNOSIS — Z71.6 ENCOUNTER FOR SMOKING CESSATION COUNSELING: ICD-10-CM

## 2024-05-13 DIAGNOSIS — R91.1 PULMONARY NODULE: Primary | ICD-10-CM

## 2024-05-13 DIAGNOSIS — Z72.0 TOBACCO ABUSE: ICD-10-CM

## 2024-05-13 PROCEDURE — 99204 OFFICE O/P NEW MOD 45 MIN: CPT

## 2024-05-13 RX ORDER — DICYCLOMINE HYDROCHLORIDE 10 MG/1
10 CAPSULE ORAL 4 TIMES DAILY
COMMUNITY
Start: 2024-05-10

## 2024-05-17 ENCOUNTER — HOSPITAL ENCOUNTER (OUTPATIENT)
Dept: PET IMAGING | Facility: HOSPITAL | Age: 44
Discharge: HOME | End: 2024-05-17
Payer: OTHER GOVERNMENT

## 2024-05-17 DIAGNOSIS — R91.1 PULMONARY NODULE: ICD-10-CM

## 2024-05-17 PROCEDURE — A9552 F18 FDG: HCPCS

## 2024-05-17 PROCEDURE — 0 FLUDEOXYGLUCOSE F18 SOLUTION

## 2024-05-17 PROCEDURE — 78815 PET IMAGE W/CT SKULL-THIGH: CPT

## 2024-05-17 RX ADMIN — FLUDEOXYGLUCOSE F 18 1 DOSE: 200 INJECTION, SOLUTION INTRAVENOUS at 09:30

## 2024-05-21 NOTE — PROGRESS NOTES
Primary Care Provider  Caryn Munoz APRN   Referring Provider  No ref. provider found      Patient Complaint  Follow-up (1 week), Results (PET), Cough (Dry cough), and Pain (Left Rib pain)      Subjective          Anni Pickard presents to [facility] PULMONARY & CRITICAL CARE MEDICINE      History of Presenting Illness  Anni Pickard is a 44 y.o. female patient with pulmonary nodules, emphysema, and tobacco use ongoing here for 2-week follow-up.    Mrs. Pickard presents today to go over results of her PET scan, accompanied by her .  She was last seen about 2 weeks ago as a new patient in our clinic, referred by ER physician for lung nodule.  Patient presented to the emergency department 5/1/2024 with abdominal pain.  CT abdomen and pelvis incidentally showed a 9 mm left noncalcified nodule in the left lower lobe.  PET scan 5/17/2024 showed no hypermetabolic uptake of the pulmonary nodules in her left lower lobe, suggesting benign etiology.  Patient denies using any antibiotics or steroids for her lungs recently, denies any fevers or chills, no hospitalizations or ER visits for her breathing since she was last seen.  Patient has never been diagnosed with any chronic lung disease.  Her respiratory complaints are mild, no shortness of breath or wheezing.  She does have a dry cough.  Patient does not use any inhalers or respiratory medications regularly.  She is a current smoker, down to half a pack per day, 24 pack years.  Patient was also exposed to secondhand smoke by her parents.  She denies any hemoptysis.  Patient has had some weight loss recently, following with gastroenterology.  She has had some night sweats, though attributes them to perimenopause.  Patient has noticed some swollen lymph nodes on the left side of her neck.  She does have a family history of lung cancer on both sides of her family.  Patient does not currently work, no exposures to fumes or toxins that she  knows of.  She has 2 cats and 1 dog, no birds.  Patient is able to perform ADLs without difficulty.  I have personally reviewed the review of systems, past family, social, medical and surgical histories; and agree with their findings.      Review of Systems    Review of Systems   Constitutional:  Negative for activity change, chills, fatigue, fever, unexpected weight gain and unexpected weight loss.   HENT:  Negative for congestion, ear discharge, ear pain, mouth sores, postnasal drip, rhinorrhea, sinus pressure, sore throat, swollen glands and trouble swallowing.    Eyes:  Negative for blurred vision, pain, discharge, itching and visual disturbance.   Respiratory:  Positive for cough (dry). Negative for apnea, chest tightness, shortness of breath, wheezing and stridor.    Cardiovascular:  Negative for chest pain, palpitations and leg swelling.   Gastrointestinal:  Negative for abdominal distention, abdominal pain, constipation, diarrhea, nausea, vomiting, GERD and indigestion.   Musculoskeletal:  Negative for arthralgias, joint swelling and myalgias.   Skin:  Negative for color change.   Neurological:  Negative for dizziness, weakness, light-headedness and headache.      Sleep: Negative for Excessive daytime sleepiness  Negative for morning headaches  Negative for Snoring      Family History   Problem Relation Age of Onset    Stroke Mother     Cancer Mother     Ovarian cancer Mother     Multiple sclerosis Father     Cancer Maternal Grandmother     Heart attack Maternal Grandmother     Cancer Paternal Grandmother         Social History     Socioeconomic History    Marital status:    Tobacco Use    Smoking status: Every Day     Current packs/day: 0.50     Average packs/day: 1 pack/day for 24.4 years (24.2 ttl pk-yrs)     Types: Cigarettes     Start date: 2024    Smokeless tobacco: Never   Vaping Use    Vaping status: Never Used   Substance and Sexual Activity    Alcohol use: Not Currently    Drug use: Never     Sexual activity: Defer        Past Medical History:   Diagnosis Date    Alcoholism     Anxiety     Depression     GERD (gastroesophageal reflux disease)     Hypertension     Kidney disease     Legally blind in right eye, as defined in [place]     Seizures     Pt  cannot recall last seizure- no current seizure medications        Immunization History   Administered Date(s) Administered    Hepatitis B Adolescent High Risk Infant 11/04/1997    MMR 10/28/1997    PPD Test 01/24/2007    Td (TDVAX) 10/28/1997       Allergies   Allergen Reactions    Loratadine-Pseudoephedrine Er Hives    Erythromycin Hives    Seroquel [Quetiapine] Other (See Comments)     Pt said she gets heart palpitations from this medication          Current Outpatient Medications:     ALPRAZolam (XANAX) 1 MG tablet, Take 1 tablet by mouth 3 times a day., Disp: , Rfl:     dicyclomine (BENTYL) 10 MG capsule, Take 1 capsule by mouth 4 (Four) Times a Day., Disp: , Rfl:     DULoxetine (CYMBALTA) 20 MG capsule, Take 1 capsule by mouth Daily., Disp: , Rfl:     gabapentin (NEURONTIN) 300 MG capsule, Take 1 capsule by mouth 3 times a day., Disp: , Rfl:     ibuprofen (ADVIL,MOTRIN) 600 MG tablet, TAKE 1 TABLET BY MOUTH EVERY 8 HOURS FOR UP TO 7 DAYS AS NEEDED FOR MILD PAIN, Disp: , Rfl:     OLANZapine (zyPREXA) 5 MG tablet, Take 1 tablet by mouth every night at bedtime., Disp: , Rfl:     pantoprazole (PROTONIX) 40 MG EC tablet, Take 1 tablet by mouth Daily., Disp: , Rfl:     amitriptyline (ELAVIL) 25 MG tablet, Take 1 tablet by mouth Daily. (Patient not taking: Reported on 5/13/2024), Disp: , Rfl:     amLODIPine-benazepril (LOTREL) 10-20 MG per capsule, Take 1 capsule by mouth Daily. (Patient not taking: Reported on 5/13/2024), Disp: , Rfl:     zolpidem CR (AMBIEN CR) 6.25 MG CR tablet, take 1 tablet by mouth every night as needed (Patient not taking: Reported on 5/13/2024), Disp: , Rfl:      Objective     Vital Signs:   /95 (BP Location: Left arm,  "Patient Position: Sitting, Cuff Size: Adult)   Pulse 99   Temp 98.7 °F (37.1 °C) (Tympanic)   Resp 16   Ht 139.7 cm (55\")   Wt 70.9 kg (156 lb 3.2 oz)   SpO2 94% Comment: room air  BMI 36.30 kg/m²     Physical Exam  Constitutional:       General: She is not in acute distress.     Appearance: Normal appearance. She is obese. She is not ill-appearing.   HENT:      Right Ear: Tympanic membrane and ear canal normal.      Left Ear: Tympanic membrane and ear canal normal.      Nose: Nose normal.      Mouth/Throat:      Mouth: Mucous membranes are moist.      Pharynx: Oropharynx is clear.   Eyes:      Extraocular Movements: Extraocular movements intact.      Conjunctiva/sclera: Conjunctivae normal.      Pupils: Pupils are equal, round, and reactive to light.   Cardiovascular:      Rate and Rhythm: Normal rate and regular rhythm.      Pulses: Normal pulses.      Heart sounds: Normal heart sounds.   Pulmonary:      Effort: Pulmonary effort is normal. No respiratory distress.      Breath sounds: Normal breath sounds. No stridor. No wheezing, rhonchi or rales.   Abdominal:      General: Bowel sounds are normal.      Palpations: Abdomen is soft.   Musculoskeletal:         General: No swelling. Normal range of motion.      Cervical back: Normal range of motion and neck supple.      Right lower leg: No edema.      Left lower leg: No edema.   Skin:     General: Skin is warm and dry.   Neurological:      General: No focal deficit present.      Mental Status: She is alert and oriented to person, place, and time.      Motor: No weakness.   Psychiatric:         Mood and Affect: Mood normal.         Behavior: Behavior normal.        Result Review :   I have personally reviewed patient's labs and images.  I also reviewed my last office note 5/13/2024.       Diagnoses and all orders for this visit:    1. Multiple pulmonary nodules (Primary)  -     CT Chest Without Contrast; Future    2. Pulmonary emphysema, unspecified emphysema " type    3. Tobacco abuse    4. Encounter for smoking cessation counseling    5. Obesity (BMI 30-39.9)      Impression and Plan    -CT abdomen and pelvis 5/3/2024 while in the ER for abdominal pain incidentally showed a 9 mm noncalcified nodule in the left lower lobe  -PET scan 5/17/2024 showed 2 left lower lobe pulmonary nodules measuring 8 mm and 7 mm, no hypermetabolic uptake which suggests a benign etiology, likely infectious or inflammatory.  Recommend short-term CT follow-up in 2 to 3 months, will order today for July 2024.  There was some mild hypermetabolic left hilar adenopathy, likely reactive.  Additionally there was hypermetabolic uptake in the endometrium, which may be physiologic, recommend pelvic ultrasound.  Emphysema also present.  -Regarding hypermetabolic area in the endometrium, patient reports history of endometriosis  -PFTs declined for now, can revisit if patient's symptoms worsen  -Patient does not use any inhalers or respiratory medications regularly  -Smoking cessation counseling provided.  I spent 5 minutes today counseling patient on the risks of smoking, including throat cancer, lung cancer, COPD, heart disease and death.  -Follow-up in 3 months after repeat chest CT    Smoking status: Reviewed  Vaccination status: Patient declines all vaccines.  Patient is advised to continue to follow CDC recommendations such as social distancing wearing a mask and washing hands for at least 20 seconds.  Medications personally reviewed    Follow Up   No follow-ups on file.  Patient was given instructions and counseling regarding her condition or for health maintenance advice. Please see specific information pulled into the AVS if appropriate.

## 2024-05-24 ENCOUNTER — OFFICE VISIT (OUTPATIENT)
Dept: PULMONOLOGY | Facility: CLINIC | Age: 44
End: 2024-05-24
Payer: OTHER GOVERNMENT

## 2024-05-24 VITALS
BODY MASS INDEX: 36.15 KG/M2 | HEIGHT: 55 IN | OXYGEN SATURATION: 94 % | DIASTOLIC BLOOD PRESSURE: 95 MMHG | WEIGHT: 156.2 LBS | RESPIRATION RATE: 16 BRPM | SYSTOLIC BLOOD PRESSURE: 136 MMHG | TEMPERATURE: 98.7 F | HEART RATE: 99 BPM

## 2024-05-24 DIAGNOSIS — Z72.0 TOBACCO ABUSE: ICD-10-CM

## 2024-05-24 DIAGNOSIS — R91.8 MULTIPLE PULMONARY NODULES: Primary | ICD-10-CM

## 2024-05-24 DIAGNOSIS — J43.9 PULMONARY EMPHYSEMA, UNSPECIFIED EMPHYSEMA TYPE: ICD-10-CM

## 2024-05-24 DIAGNOSIS — E66.9 OBESITY (BMI 30-39.9): ICD-10-CM

## 2024-05-24 DIAGNOSIS — Z71.6 ENCOUNTER FOR SMOKING CESSATION COUNSELING: ICD-10-CM

## 2024-05-24 PROCEDURE — 99214 OFFICE O/P EST MOD 30 MIN: CPT

## 2024-05-31 ENCOUNTER — TELEPHONE (OUTPATIENT)
Dept: UROLOGY | Facility: CLINIC | Age: 44
End: 2024-05-31
Payer: OTHER GOVERNMENT

## 2024-05-31 NOTE — TELEPHONE ENCOUNTER
PATIENT'S  CALLED IN REGARDING REFERRAL FROM BACK IN THE BEGINNING OF MAY FOR RENAL [place].    WE HAD SCHEDULED W/ [facility] ON 6/19, BUT PATIENT  IS STATING SHE WOULD RATHER SEE DR. ROBISON IF POSSIBLE.     ADVISED I WOULD SEND A MESSAGE TO DR. ROBISON STAFF TO ADVISE ON SCHEDULING.    PLEASE ADVISE?     REFERRAL IS ALSO IN CHART NOW.

## 2024-05-31 NOTE — TELEPHONE ENCOUNTER
PER BRADLEY, PT CAN BE ADDED 6/3 @ 8:30    ATTEMPTED TO CALL  BACK, NUMBER INVALID. ATTEMPTED TO CALL HOME NUMBER. NO ANSWER, MALCOLM    CALLED NELDA @ [facility] AND SHE WILL SEND PATIENT A [facility] MESSAGE TO CALL OUR OFFICE.

## 2024-06-04 ENCOUNTER — OFFICE VISIT (OUTPATIENT)
Dept: UROLOGY | Facility: CLINIC | Age: 44
End: 2024-06-04
Payer: OTHER GOVERNMENT

## 2024-06-04 VITALS — RESPIRATION RATE: 18 BRPM

## 2024-06-04 DIAGNOSIS — N28.9 RENAL LESION: ICD-10-CM

## 2024-06-04 DIAGNOSIS — N20.0 NEPHROLITHIASIS: Primary | ICD-10-CM

## 2024-06-04 PROCEDURE — 99213 OFFICE O/P EST LOW 20 MIN: CPT | Performed by: UROLOGY

## 2024-06-04 NOTE — PROGRESS NOTES
Chief Complaint: Urologic complaint    Subjective         History of Present Illness  April Dena Pickard is a 44 y.o. female         Renal lesion  Nephrolithiasis    No acute stone episode in the last couple years    No GH    No UTIs    Not currently on any medications for kidney stone prevention    5/17/2024 PET-left lower lobe pulmonary nodule 8 mm and 7 mm.  Suggest benign etiology.    5/1/2024 CT abdomen/pelvis with - no stones on the left, 2 mm and 4 mm nonobstructing stone on the right.  9 mm indeterminate noncalcified lower left lobe nodule.  6 mm indeterminate hypodense mass lower pole right kidney not a simple cyst.  Recommend MRI.  Images reviewed  5/24 0.5,       No cardiopulmonary history.  Smoker 0.5 packs/day.  No anticoagulation.      Previous    2/12/2021 CT abdomen/pelvis without - 1 punctate stone on the right and 1 punctate stone on the left. Ureteral stones.    20 surgeries for kidney stones. She is not on any medication for kidney stone prevention at this time.    Patient has been on potassium citrate in the past for stones, not on this currently. Patient has also been on HCTZ 12.5 mg in the past her PCP, she is been out of this for several months.       4/28/2019 CT abdomen/pelvis withoutno obstructing stones. Patient has about 8 stones in her left side several letter 3 or 4 mm. Patient also has about 3 to 4 stones on her right side these are also about 3 to 4 mm.      3/19/18 CT abdomen/pelvis without 3 nonobstructing stones in the left. 8 mm stone in the lower pole left kidney. Other 2 stones are small. 4 nonobstructing stones on the right. Largest 5 mm.     6/16/16 CT abdomen/pelvis without contrast - done at [place], 3 stones on the left all about 4-5 mm, 3 stones on the right all about 2-4 mm, no ureteral stones and no hydronephrosis    No urologic family history      Objective     Past Medical History:   Diagnosis Date    Alcoholism     Anxiety     Depression     GERD  (gastroesophageal reflux disease)     Hypertension     Kidney disease     Legally blind in right eye, as defined in [place]     Seizures     Pt  cannot recall last seizure- no current seizure medications             Social History     Socioeconomic History    Marital status:    Tobacco Use    Smoking status: Every Day     Current packs/day: 0.50     Average packs/day: 1 pack/day for 24.4 years (24.2 ttl pk-yrs)     Types: Cigarettes     Start date: 2024    Smokeless tobacco: Never   Vaping Use    Vaping status: Never Used   Substance and Sexual Activity    Alcohol use: Not Currently    Drug use: Never    Sexual activity: Defer               Assessment and Plan    Diagnoses and all orders for this visit:    1. Nephrolithiasis (Primary)      The patient was counseled on the preventative measures of kidney stones today.  This included increasing fluid intake to make at least 1.5 ml daily, decreasing meat intake, decreasing salt intake and taking in a normal amount of calcium (1000 mg daily).  Information handout given on this today.         We also discussed the DASH diet today for stone prevention and handout was given          Renal lesion      CT and records reviewed with the patient.    Patient with a 6 mm indeterminate lesion.  We did discuss there is always a small possibility of this being a renal cell carcinoma.  We will follow-up on this with an MRI abdomen with without contrast in 1 year    Patient understands ruling out malignancy and she must follow-up.

## 2024-09-27 ENCOUNTER — HOSPITAL ENCOUNTER (OUTPATIENT)
Dept: CT IMAGING | Facility: HOSPITAL | Age: 44
Discharge: HOME | End: 2024-09-27
Payer: OTHER GOVERNMENT

## 2024-09-27 DIAGNOSIS — R91.8 MULTIPLE PULMONARY NODULES: ICD-10-CM

## 2024-09-27 PROCEDURE — 71250 CT THORAX DX C-: CPT

## 2024-10-02 ENCOUNTER — TELEPHONE (OUTPATIENT)
Dept: PULMONOLOGY | Facility: CLINIC | Age: 44
End: 2024-10-02
Payer: OTHER GOVERNMENT

## 2024-10-02 NOTE — TELEPHONE ENCOUNTER
"  Left voicemail for patient to call for CT results.  Okay for hub to relay        Relay       \"Please let Ms. Pickard know that her chest CT showed that her pulmonary nodules are stable.  There were no longer any enlarged lymph nodes around her lungs.  Thank you! \"                 "

## 2025-01-16 ENCOUNTER — OFFICE VISIT (OUTPATIENT)
Dept: INTERNAL MEDICINE | Age: 45
End: 2025-01-16
Payer: OTHER GOVERNMENT

## 2025-01-16 VITALS
SYSTOLIC BLOOD PRESSURE: 124 MMHG | HEART RATE: 104 BPM | BODY MASS INDEX: 31.57 KG/M2 | OXYGEN SATURATION: 94 % | HEIGHT: 55 IN | DIASTOLIC BLOOD PRESSURE: 90 MMHG | WEIGHT: 136.4 LBS | TEMPERATURE: 97.4 F

## 2025-01-16 DIAGNOSIS — Z12.4 SCREENING FOR CERVICAL CANCER: ICD-10-CM

## 2025-01-16 DIAGNOSIS — R73.03 PREDIABETES: ICD-10-CM

## 2025-01-16 DIAGNOSIS — E55.9 VITAMIN D DEFICIENCY: ICD-10-CM

## 2025-01-16 DIAGNOSIS — Z00.00 LABORATORY EXAMINATION ORDERED AS PART OF A ROUTINE GENERAL MEDICAL EXAMINATION: ICD-10-CM

## 2025-01-16 DIAGNOSIS — K21.9 GASTROESOPHAGEAL REFLUX DISEASE WITHOUT ESOPHAGITIS: Primary | ICD-10-CM

## 2025-01-16 DIAGNOSIS — F31.9 BIPOLAR AFFECTIVE DISORDER, REMISSION STATUS UNSPECIFIED: ICD-10-CM

## 2025-01-16 DIAGNOSIS — H54.8 LEGALLY BLIND IN RIGHT EYE, AS DEFINED IN USA: ICD-10-CM

## 2025-01-16 DIAGNOSIS — K21.9 GASTROESOPHAGEAL REFLUX DISEASE WITHOUT ESOPHAGITIS: ICD-10-CM

## 2025-01-16 DIAGNOSIS — F43.10 PTSD (POST-TRAUMATIC STRESS DISORDER): ICD-10-CM

## 2025-01-16 PROCEDURE — 99214 OFFICE O/P EST MOD 30 MIN: CPT | Performed by: INTERNAL MEDICINE

## 2025-01-16 RX ORDER — PANTOPRAZOLE SODIUM 40 MG/1
40 TABLET, DELAYED RELEASE ORAL DAILY
Qty: 30 TABLET | Refills: 1 | Status: SHIPPED | OUTPATIENT
Start: 2025-01-16

## 2025-01-16 RX ORDER — PANTOPRAZOLE SODIUM 40 MG/1
40 TABLET, DELAYED RELEASE ORAL DAILY
Qty: 90 TABLET | OUTPATIENT
Start: 2025-01-16

## 2025-01-16 RX ORDER — ANTIARTHRITIC COMBINATION NO.2 900 MG
TABLET ORAL
COMMUNITY

## 2025-01-16 NOTE — ASSESSMENT & PLAN NOTE
GERD-fair contrl  -reflux precautions--discussed    Plan-refill PPI for a month then every other day /prn

## 2025-01-16 NOTE — PROGRESS NOTES
CHIEF COMPLAINT  Anni Pickard presents to [facility] to Establish Care, Depression (Patient would like to go back on cymbalta for bipolar and depression ), and Hypothyroidism (Patient says she is having issues with her thyroid.  Thyroid is under active. )     HPI  Theron with -out of xanax and gabapentin-    45 yo pt here to [facility] care-PCP was Caryn Munoz , APRN--lost 60 lbs in 2 months-had nervous breakdown and couldn't eat    PMH-Bipolar-depression, hypothyroidism PTSD multiple pulmonary nodules , diabetes blindness of right eye nephrolithiasis, history of alcohol dependence anxiety depression GERD  Pulm nodules--seeing pulm -last CT chest 9/2024--stable    Declined further mammograms    Bipolar-Going to "[employer]"a-has appt in couple weeks-wants to get off gabapentin and get cymbalta-cont with xanax    Records reviewed, meds reviewed in detail, labs reviewed with patient.  Plan of care is as follows below.    PMFSH-Reviewed  ROS-anxiety depression    Sh- used to drink - smokes and trying to quit <1/2 PPD    Past History:  Allergies: Loratadine-pseudoephedrine er, Erythromycin, and Seroquel [quetiapine]   Medical History: has a past medical history of Alcoholism, Anxiety, Depression, GERD (gastroesophageal reflux disease), Hypertension, Kidney disease, Legally blind in right eye, as defined in [place], and Seizures.   Surgical History: has a past surgical history that includes Ureteroscopy; Enucleation (Right); Lipoma Excision; Ureteral stent placement; and Cystoscopy w/ ureteroscopy.   Family History: family history includes Cancer in her maternal grandmother, mother, and paternal grandmother; Heart attack in her maternal grandmother; Multiple sclerosis in her father; Ovarian cancer in her mother; Stroke in her mother.   Social History: reports that she has been smoking cigarettes. She started smoking about 12 months ago. She has a 24.5 pack-year smoking history. She has never  "used smokeless tobacco. She reports that she does not currently use alcohol. She reports that she does not use drugs.  Social History     Social History Narrative    Not on file         Current Outpatient Medications:     ALPRAZolam (XANAX) 1 MG tablet, Take 1 tablet by mouth 3 times a day., Disp: , Rfl:     dicyclomine (BENTYL) 10 MG capsule, Take 1 capsule by mouth 4 (Four) Times a Day., Disp: , Rfl:     gabapentin (NEURONTIN) 300 MG capsule, Take 1 capsule by mouth 3 times a day., Disp: , Rfl:     ibuprofen (ADVIL,MOTRIN) 600 MG tablet, TAKE 1 TABLET BY MOUTH EVERY 8 HOURS FOR UP TO 7 DAYS AS NEEDED FOR MILD PAIN, Disp: , Rfl:     Multiple Vitamins-Minerals (Womens Multi) capsule, Take  by mouth., Disp: , Rfl:     pantoprazole (PROTONIX) 40 MG EC tablet, Take 1 tablet by mouth Daily., Disp: 30 tablet, Rfl: 1    zolpidem CR (AMBIEN CR) 6.25 MG CR tablet, , Disp: , Rfl:     DULoxetine (CYMBALTA) 20 MG capsule, Take 1 capsule by mouth Daily., Disp: , Rfl:      OBJECTIVE  Vital Signs  Vitals:    01/16/25 1504   BP: 124/90   BP Location: Left arm   Patient Position: Sitting   Cuff Size: Small Adult   Pulse: 104   Temp: 97.4 °F (36.3 °C)   SpO2: 94%   Weight: 61.9 kg (136 lb 6.4 oz)   Height: 139.7 cm (55\")      Body mass index is 31.7 kg/m².      Physical Exam  Vitals and nursing note reviewed.   Constitutional:       Appearance: Normal appearance.   HENT:      Head: Normocephalic and atraumatic.   Cardiovascular:      Rate and Rhythm: Normal rate and regular rhythm.   Pulmonary:      Effort: Pulmonary effort is normal.      Breath sounds: Normal breath sounds.   Abdominal:      Palpations: Abdomen is soft.   Musculoskeletal:      Cervical back: Normal range of motion and neck supple.   Neurological:      General: No focal deficit present.      Mental Status: She is alert and oriented to person, place, and time.         RESULTS REVIEW  No results found for: \"PROBNP\", \"BNP\"  CMP          4/13/2024    14:33 4/24/2024    " 08:11 5/1/2024    15:34   CMP   Glucose 126  113  121    BUN 12  10  7    Creatinine 0.91  0.73  0.72    EGFR 80.4  104.1  105.9    Sodium 137  141  139    Potassium 3.6  3.1  3.6    Chloride 106  106  106    Calcium 10.4  9.4  9.6    Total Protein 7.5  6.6  6.7    Albumin 4.7  4.2  4.2    Globulin 2.8  2.4  2.5    Total Bilirubin 0.5  0.6  0.4    Alkaline Phosphatase 104  100  114    AST (SGOT) 16  19  28    ALT (SGPT) 16  42  28    Albumin/Globulin Ratio 1.7  1.8  1.7    BUN/Creatinine Ratio 13.2  13.7  9.7    Anion Gap 16.1  13.8  14.9      CBC w/diff          4/24/2024    08:11 5/1/2024    15:34 5/10/2024    09:48   CBC w/Diff   WBC 9.54  14.40  12.74       RBC 4.55  5.02  4.29       Hemoglobin 14.2  15.8  13.6       Hematocrit 40.5  44.0  39.7       MCV 89.0  87.6  92.5       MCH 31.2  31.5  31.7       MCHC 35.1  35.9  34.3       RDW 13.1  13.0  13.2       Platelets 422  470  404       Neutrophil Rel % 49.0   65.4       Immature Granulocyte Rel % 0.2   0.3       Lymphocyte Rel % 40.5   25.7       Monocyte Rel % 7.2   6.8       Eosinophil Rel % 1.8   1.0       Basophil Rel % 1.3   0.8          Details          This result is from an external source.                 Lab Results   Component Value Date    TSH 1.500 11/29/2023    TSH 1.230 11/25/2020    TSH 1.400 06/22/2020      Lab Results   Component Value Date    FREET4 1.35 11/29/2023    FREET4 1.0 11/25/2020    FREET4 1.4 06/22/2020            CT Chest Without Contrast Diagnostic    Result Date: 9/30/2024  1. Stable left pulmonary nodules. 2. Stable left breast nodule. Recommend mammographic correlation Electronically Signed: Khadar Green  9/30/2024 3:07 PM EDT  Workstation ID: [id number]                ASSESSMENT & PLAN  Diagnoses and all orders for this visit:    1. Gastroesophageal reflux disease without esophagitis (Primary)  Assessment & Plan:  GERD-fair contrl  -reflux precautions--discussed    Plan-refill PPI for a month then every other day  /prn      Orders:  -     Comprehensive Metabolic Panel; Future  -     Lipid Panel; Future  -     TSH+Free T4; Future  -     T3, Free; Future  -     Vitamin B12; Future  -     Folate; Future  -     Magnesium; Future  -     Vitamin D,25-Hydroxy; Future  -     CBC & Differential; Future  -     MicroAlbumin, Urine, Random - Urine, Clean Catch; Future  -     pantoprazole (PROTONIX) 40 MG EC tablet; Take 1 tablet by mouth Daily.  Dispense: 30 tablet; Refill: 1    2. Bipolar affective disorder, remission status unspecified  Assessment & Plan:  Appears stable followed at [facility] on gabapentin Xanax Cymbalta and Ambien.    Plan -patient has appointment a few weeks   explained that [facility] will need to refill her medications since they are controlled and following her mental health disorder.      3. Laboratory examination ordered as part of a routine general medical examination  -     Comprehensive Metabolic Panel; Future  -     Lipid Panel; Future  -     TSH+Free T4; Future  -     T3, Free; Future  -     Vitamin B12; Future  -     Folate; Future  -     Magnesium; Future  -     Vitamin D,25-Hydroxy; Future  -     CBC & Differential; Future  -     MicroAlbumin, Urine, Random - Urine, Clean Catch; Future    4. Vitamin D deficiency  -     Vitamin D,25-Hydroxy; Future    5. Prediabetes  Comments:  Check A1c follow a low-carb diet  Orders:  -     Hemoglobin A1c; Future    6. PTSD (post-traumatic stress disorder)    7. Legally blind in right eye, as defined in [place]    8. Screening for cervical cancer  -     Ambulatory Referral to Obstetrics / Gynecology         BMI is >= 30 and <35. (Class 1 Obesity). The following options were offered after discussion;: weight loss educational material (shared in after visit summary), exercise counseling/recommendations, and nutrition counseling/recommendations     Plan-1/16/2025  Patient Instructions   F/u with [facility]  Do labs today   Follow low carb diet  Script for pantoprazole for a month the every  other day -can use pepcid every other day    F/u with [facility] -Ct chest due 9/2025  Refer to ob gyn     FOLLOW UP  Return in about 26 days (around 2/11/2025) for Annual physical, Recheck.    Patient was given instructions and counseling regarding her condition or for health maintenance advice. Please see specific information pulled into the AVS if appropriate.

## 2025-01-16 NOTE — PATIENT INSTRUCTIONS
F/u with [facility]  Do labs today   Follow low carb diet  Script for pantoprazole for a month the every other day -can use pepcid every other day    F/u with [facility] -Ct chest due 9/2025  Refer to ob gyn

## 2025-01-16 NOTE — ASSESSMENT & PLAN NOTE
Appears stable followed at [facility] on gabapentin Xanax Cymbalta and Ambien.    Plan -patient has appointment a few weeks   explained that [facility] will need to refill her medications since they are controlled and following her mental health disorder.

## 2025-01-29 ENCOUNTER — TELEPHONE (OUTPATIENT)
Dept: INTERNAL MEDICINE | Age: 45
End: 2025-01-29
Payer: OTHER GOVERNMENT

## 2025-01-29 DIAGNOSIS — N64.4 PAIN OF LEFT BREAST: Primary | ICD-10-CM

## 2025-01-29 DIAGNOSIS — N63.20 MASS OF LEFT BREAST, UNSPECIFIED QUADRANT: ICD-10-CM

## 2025-01-29 NOTE — TELEPHONE ENCOUNTER
Caller: PHILIP ZAVALA    Relationship: Emergency Contact    Best call back number: [phone]    What orders are you requesting (i.e. lab or imaging): MAMMOGRAM    In what timeframe would the patient need to come in: ASAP    Where will you receive your lab/imaging services: N/A    Additional notes: PATIENT NEEDS TO SCHEDULE A MAMMOGRAM. PLEASE PUT ORDERS IN SYSTEM AND CALL PATIENT BACK TO SCHEDULE AND ADVISE.

## 2025-01-30 NOTE — TELEPHONE ENCOUNTER
Spoke w/pt's , stated pt has a painful lump on left breast. Has had pain w/it for about 2 months.    Ok to send orders for diagnostic mammogram and US of left breast?    Orders pended, please check if agreeable.

## 2025-02-06 ENCOUNTER — TELEPHONE (OUTPATIENT)
Dept: INTERNAL MEDICINE | Age: 45
End: 2025-02-06
Payer: OTHER GOVERNMENT

## 2025-02-06 DIAGNOSIS — N64.4 PAIN OF LEFT BREAST: Primary | ICD-10-CM

## 2025-02-06 DIAGNOSIS — N63.20 MASS OF LEFT BREAST, UNSPECIFIED QUADRANT: ICD-10-CM

## 2025-02-06 NOTE — TELEPHONE ENCOUNTER
Pend for signature, due to patient having breast pain, radiology needed order to be changed to diagnostic.

## 2025-03-24 DIAGNOSIS — K21.9 GASTROESOPHAGEAL REFLUX DISEASE WITHOUT ESOPHAGITIS: ICD-10-CM

## 2025-03-24 RX ORDER — PANTOPRAZOLE SODIUM 40 MG/1
40 TABLET, DELAYED RELEASE ORAL DAILY
Qty: 30 TABLET | Refills: 1 | Status: SHIPPED | OUTPATIENT
Start: 2025-03-24

## 2025-03-27 ENCOUNTER — NURSE TRIAGE (OUTPATIENT)
Dept: CALL CENTER | Facility: HOSPITAL | Age: 45
End: 2025-03-27
Payer: OTHER GOVERNMENT

## 2025-03-27 ENCOUNTER — TELEPHONE (OUTPATIENT)
Dept: INTERNAL MEDICINE | Age: 45
End: 2025-03-27

## 2025-03-27 NOTE — TELEPHONE ENCOUNTER
C/o fatigue and rash. Hives generalized, itching, took Benadryl. Would like to make appointment with PCP. Transferred to the [facility] by the patient connection Hub because patient also has a possible broken toe. Accidentally kicked something yesterday. Reviewed protocol with patient's . Attempted to connect patient's  with Dr. Call's office to schedule appointment, no answer. Connected patient's  back with patient connection Hub to schedule appointment.     Reason for Disposition   [1] Hives from food reaction AND [2] diagnosis never confirmed by a doctor (or NP/PA)    Additional Information   Negative: [1] Life-threatening reaction (anaphylaxis) in the past to similar substance (e.g., food, insect bite/sting, chemical, etc.) AND [2] < 2 hours since exposure   Negative: Difficulty breathing or wheezing now   Negative: [1] Swollen tongue AND [2] rapid onset   Negative: [1] Hoarseness or cough now AND [2] rapid onset   Negative: Shock suspected (e.g., cold/pale/clammy skin, too weak to stand, low BP, rapid pulse)   Negative: Difficult to awaken or acting confused (e.g., disoriented, slurred speech)   Negative: Sounds like a life-threatening emergency to the triager   Negative: [1] Bee, wasp, or yellow jacket sting AND [2] within last 24 hours   Negative: Blood-colored, dark red or purple rash   Negative: [1] Drug rash suspected AND [2] started taking new medicine within last 2 weeks(Exception: Antihistamine, eye drops, ear drops, decongestant or other OTC cough/cold medicines.)   Negative: Doesn't match the SYMPTOMS of hives   Negative: Swollen tongue   Negative: [1] Widespread hives, itching or facial swelling AND [2] onset < 2 hours of exposure to high-risk allergen (e.g., sting, nuts, 1st dose of antibiotic)   Negative: Patient sounds very sick or weak to the triager   Negative: [1] MODERATE-SEVERE hives persist (i.e., hives interfere with normal activities or work) AND [2] taking  "antihistamine (e.g., Benadryl, Claritin) > 24 hours   Negative: [1] Hives have become worse AND [2] taking oral steroids (e.g., prednisone) > 24 hours   Negative: Joint swelling   Negative: [1] Abdominal pain AND [2] pain present > 12 hours   Negative: Fever   Negative: [1] Widespread hives, itching or facial swelling AND [2] onset > 2 hours after exposure to high-risk allergen (e.g., sting, nuts, 1st dose of antibiotic)    Answer Assessment - Initial Assessment Questions  1. APPEARANCE: \"What does the rash look like?\"       Hives   2. LOCATION: \"Where is the rash located?\"       Generalized   3. NUMBER: \"How many hives are there?\"       Too numerous to count  4. SIZE: \"How big are the hives?\" (inches, cm, compare to coins) \"Do they all look the same or is there lots of variation in shape and size?\"       Small   5. ONSET: \"When did the hives begin?\" (Hours or days ago)       Today   6. ITCHING: \"Does it itch?\" If Yes, ask: \"How bad is the itch?\"     - MILD: doesn't interfere with normal activities    - MODERATE-SEVERE: interferes with work, school, sleep, or other activities       Mild itching  7. RECURRENT PROBLEM: \"Have you had hives before?\" If Yes, ask: \"When was the last time?\" and \"What happened that time?\"       Yes   8. TRIGGERS: \"Were you exposed to any new food, plant, cosmetic product or animal just before the hives began?\"      Unknown   9. OTHER SYMPTOMS: \"Do you have any other symptoms?\" (e.g., fever, tongue swelling, difficulty breathing, abdomen pain)      Fatigue   10. PREGNANCY: \"Is there any chance you are pregnant?\" \"When was your last menstrual period?\"        N/A    Protocols used: Hives-ADULT-AH    "

## 2025-03-27 NOTE — TELEPHONE ENCOUNTER
Left detailed vm.    Hub may relay message that pt has fasting labs to be done prior to appt at earliest convenience. Thank you.

## 2025-03-27 NOTE — TELEPHONE ENCOUNTER
Caller: PHILIP ZAVALA    Relationship: Emergency Contact    Best call back number:     [phone]       What was the call regarding: PATIENT'S  STATES THAT THEY WOULD LIKE A CALLBACK TO LET THEM KNOW IF THE PATIENT NEEDS TO FAST FOR HER LABS OR NOT.

## 2025-04-01 ENCOUNTER — LAB (OUTPATIENT)
Dept: LAB | Facility: HOSPITAL | Age: 45
End: 2025-04-01
Payer: OTHER GOVERNMENT

## 2025-04-01 DIAGNOSIS — R73.03 PREDIABETES: ICD-10-CM

## 2025-04-01 DIAGNOSIS — K21.9 GASTROESOPHAGEAL REFLUX DISEASE WITHOUT ESOPHAGITIS: ICD-10-CM

## 2025-04-01 DIAGNOSIS — E55.9 VITAMIN D DEFICIENCY: ICD-10-CM

## 2025-04-01 DIAGNOSIS — Z11.59 NEED FOR HEPATITIS C SCREENING TEST: ICD-10-CM

## 2025-04-01 DIAGNOSIS — Z00.00 LABORATORY EXAMINATION ORDERED AS PART OF A ROUTINE GENERAL MEDICAL EXAMINATION: ICD-10-CM

## 2025-04-01 DIAGNOSIS — N93.9 VAGINAL BLEEDING: ICD-10-CM

## 2025-04-01 LAB
25(OH)D3 SERPL-MCNC: 74.6 NG/ML (ref 30–100)
ALBUMIN UR-MCNC: <1.2 MG/DL
BASOPHILS # BLD AUTO: 0.15 10*3/MM3 (ref 0–0.2)
BASOPHILS NFR BLD AUTO: 1.3 % (ref 0–1.5)
DEPRECATED RDW RBC AUTO: 42.7 FL (ref 37–54)
EOSINOPHIL # BLD AUTO: 0.35 10*3/MM3 (ref 0–0.4)
EOSINOPHIL NFR BLD AUTO: 2.9 % (ref 0.3–6.2)
ERYTHROCYTE [DISTWIDTH] IN BLOOD BY AUTOMATED COUNT: 12.1 % (ref 12.3–15.4)
FOLATE SERPL-MCNC: >20 NG/ML (ref 4.78–24.2)
HBA1C MFR BLD: 4.7 % (ref 4.8–5.6)
HCT VFR BLD AUTO: 45.6 % (ref 34–46.6)
HGB BLD-MCNC: 15.7 G/DL (ref 12–15.9)
IMM GRANULOCYTES # BLD AUTO: 0.03 10*3/MM3 (ref 0–0.05)
IMM GRANULOCYTES NFR BLD AUTO: 0.3 % (ref 0–0.5)
LYMPHOCYTES # BLD AUTO: 4.86 10*3/MM3 (ref 0.7–3.1)
LYMPHOCYTES NFR BLD AUTO: 40.8 % (ref 19.6–45.3)
MCH RBC QN AUTO: 32.8 PG (ref 26.6–33)
MCHC RBC AUTO-ENTMCNC: 34.4 G/DL (ref 31.5–35.7)
MCV RBC AUTO: 95.4 FL (ref 79–97)
MONOCYTES # BLD AUTO: 0.89 10*3/MM3 (ref 0.1–0.9)
MONOCYTES NFR BLD AUTO: 7.5 % (ref 5–12)
NEUTROPHILS NFR BLD AUTO: 47.2 % (ref 42.7–76)
NEUTROPHILS NFR BLD AUTO: 5.64 10*3/MM3 (ref 1.7–7)
NRBC BLD AUTO-RTO: 0 /100 WBC (ref 0–0.2)
PLATELET # BLD AUTO: 438 10*3/MM3 (ref 140–450)
PMV BLD AUTO: 9.7 FL (ref 6–12)
RBC # BLD AUTO: 4.78 10*6/MM3 (ref 3.77–5.28)
VIT B12 BLD-MCNC: 1636 PG/ML (ref 211–946)
WBC NRBC COR # BLD AUTO: 11.92 10*3/MM3 (ref 3.4–10.8)

## 2025-04-01 PROCEDURE — 83001 ASSAY OF GONADOTROPIN (FSH): CPT

## 2025-04-01 PROCEDURE — 36415 COLL VENOUS BLD VENIPUNCTURE: CPT

## 2025-04-01 PROCEDURE — 85025 COMPLETE CBC W/AUTO DIFF WBC: CPT

## 2025-04-01 PROCEDURE — 84443 ASSAY THYROID STIM HORMONE: CPT

## 2025-04-01 PROCEDURE — 83002 ASSAY OF GONADOTROPIN (LH): CPT

## 2025-04-01 PROCEDURE — 84439 ASSAY OF FREE THYROXINE: CPT

## 2025-04-01 PROCEDURE — 83735 ASSAY OF MAGNESIUM: CPT

## 2025-04-01 PROCEDURE — 82306 VITAMIN D 25 HYDROXY: CPT

## 2025-04-01 PROCEDURE — 84481 FREE ASSAY (FT-3): CPT

## 2025-04-01 PROCEDURE — 83036 HEMOGLOBIN GLYCOSYLATED A1C: CPT

## 2025-04-01 PROCEDURE — 82607 VITAMIN B-12: CPT

## 2025-04-01 PROCEDURE — 80061 LIPID PANEL: CPT

## 2025-04-01 PROCEDURE — 86803 HEPATITIS C AB TEST: CPT

## 2025-04-01 PROCEDURE — 80053 COMPREHEN METABOLIC PANEL: CPT

## 2025-04-01 PROCEDURE — 82746 ASSAY OF FOLIC ACID SERUM: CPT

## 2025-04-01 PROCEDURE — 82043 UR ALBUMIN QUANTITATIVE: CPT

## 2025-04-01 RX ORDER — LURASIDONE HYDROCHLORIDE 20 MG/1
1 TABLET, FILM COATED ORAL DAILY
COMMUNITY
Start: 2025-03-05 | End: 2025-04-02

## 2025-04-02 ENCOUNTER — OFFICE VISIT (OUTPATIENT)
Dept: INTERNAL MEDICINE | Age: 45
End: 2025-04-02
Payer: OTHER GOVERNMENT

## 2025-04-02 VITALS
DIASTOLIC BLOOD PRESSURE: 90 MMHG | HEIGHT: 55 IN | TEMPERATURE: 98.4 F | SYSTOLIC BLOOD PRESSURE: 128 MMHG | BODY MASS INDEX: 32.49 KG/M2 | WEIGHT: 140.4 LBS | HEART RATE: 86 BPM | OXYGEN SATURATION: 96 %

## 2025-04-02 DIAGNOSIS — S99.921A INJURY OF TOE ON RIGHT FOOT, INITIAL ENCOUNTER: ICD-10-CM

## 2025-04-02 DIAGNOSIS — H61.22 IMPACTED CERUMEN OF LEFT EAR: ICD-10-CM

## 2025-04-02 DIAGNOSIS — I10 ESSENTIAL HYPERTENSION: ICD-10-CM

## 2025-04-02 DIAGNOSIS — H54.8 LEGALLY BLIND IN RIGHT EYE, AS DEFINED IN USA: ICD-10-CM

## 2025-04-02 DIAGNOSIS — Z12.39 ENCOUNTER FOR SCREENING BREAST EXAMINATION: ICD-10-CM

## 2025-04-02 DIAGNOSIS — N93.9 VAGINAL BLEEDING: ICD-10-CM

## 2025-04-02 DIAGNOSIS — G89.29 LEFT FLANK PAIN, CHRONIC: ICD-10-CM

## 2025-04-02 DIAGNOSIS — Z00.00 ROUTINE HISTORY AND PHYSICAL EXAMINATION OF ADULT: Primary | ICD-10-CM

## 2025-04-02 DIAGNOSIS — R10.9 LEFT FLANK PAIN, CHRONIC: ICD-10-CM

## 2025-04-02 DIAGNOSIS — E78.00 PURE HYPERCHOLESTEROLEMIA: ICD-10-CM

## 2025-04-02 DIAGNOSIS — E55.9 VITAMIN D DEFICIENCY: ICD-10-CM

## 2025-04-02 DIAGNOSIS — Z11.59 NEED FOR HEPATITIS C SCREENING TEST: ICD-10-CM

## 2025-04-02 DIAGNOSIS — F31.9 BIPOLAR AFFECTIVE DISORDER, REMISSION STATUS UNSPECIFIED: ICD-10-CM

## 2025-04-02 PROBLEM — R73.03 PREDIABETES: Status: RESOLVED | Noted: 2025-01-16 | Resolved: 2025-04-02

## 2025-04-02 LAB
ALBUMIN SERPL-MCNC: 4.1 G/DL (ref 3.5–5.2)
ALBUMIN/GLOB SERPL: 1.5 G/DL
ALP SERPL-CCNC: 100 U/L (ref 39–117)
ALT SERPL W P-5'-P-CCNC: 10 U/L (ref 1–33)
ANION GAP SERPL CALCULATED.3IONS-SCNC: 12.4 MMOL/L (ref 5–15)
AST SERPL-CCNC: 18 U/L (ref 1–32)
BILIRUB SERPL-MCNC: 0.4 MG/DL (ref 0–1.2)
BUN SERPL-MCNC: 8 MG/DL (ref 6–20)
BUN/CREAT SERPL: 10.3 (ref 7–25)
CALCIUM SPEC-SCNC: 10.1 MG/DL (ref 8.6–10.5)
CHLORIDE SERPL-SCNC: 105 MMOL/L (ref 98–107)
CHOLEST SERPL-MCNC: 236 MG/DL (ref 0–200)
CO2 SERPL-SCNC: 20.6 MMOL/L (ref 22–29)
CREAT SERPL-MCNC: 0.78 MG/DL (ref 0.57–1)
EGFRCR SERPLBLD CKD-EPI 2021: 96.2 ML/MIN/1.73
FSH SERPL-ACNC: 4.51 MIU/ML
GLOBULIN UR ELPH-MCNC: 2.7 GM/DL
GLUCOSE SERPL-MCNC: 103 MG/DL (ref 65–99)
HCV AB SER QL: NORMAL
HDLC SERPL-MCNC: 36 MG/DL (ref 40–60)
LDLC SERPL CALC-MCNC: 176 MG/DL (ref 0–100)
LDLC/HDLC SERPL: 4.83 {RATIO}
LH SERPL-ACNC: 6.4 MIU/ML
MAGNESIUM SERPL-MCNC: 1.8 MG/DL (ref 1.6–2.6)
POTASSIUM SERPL-SCNC: 3.3 MMOL/L (ref 3.5–5.2)
PROT SERPL-MCNC: 6.8 G/DL (ref 6–8.5)
SODIUM SERPL-SCNC: 138 MMOL/L (ref 136–145)
T3FREE SERPL-MCNC: 3 PG/ML (ref 2–4.4)
T4 FREE SERPL-MCNC: 1.25 NG/DL (ref 0.92–1.68)
TRIGL SERPL-MCNC: 130 MG/DL (ref 0–150)
TSH SERPL DL<=0.05 MIU/L-ACNC: 0.86 UIU/ML (ref 0.27–4.2)
VLDLC SERPL-MCNC: 24 MG/DL (ref 5–40)

## 2025-04-02 RX ORDER — DICYCLOMINE HYDROCHLORIDE 10 MG/1
10 CAPSULE ORAL 4 TIMES DAILY
Qty: 120 CAPSULE | Refills: 5 | Status: SHIPPED | OUTPATIENT
Start: 2025-04-02

## 2025-04-02 RX ORDER — ATORVASTATIN CALCIUM 20 MG/1
20 TABLET, FILM COATED ORAL DAILY
Qty: 90 TABLET | Refills: 1 | Status: SHIPPED | OUTPATIENT
Start: 2025-04-02

## 2025-04-02 NOTE — PATIENT INSTRUCTIONS
Refer back to Dr Queen  Xray foot  Schedule  mamm  F/u with Dr Wahl  F/u at [facility] 1x/month  Decrease MVI MWF

## 2025-04-02 NOTE — ASSESSMENT & PLAN NOTE
Blood pressure stable on no medications patient has lost 50 pounds over the past several years which has helped keep her blood pressure under control    Plan continue to follow salt diet no meds needed at this time  Goal blood pressures less than 130/80

## 2025-04-02 NOTE — PROGRESS NOTES
CHIEF COMPLAINT  Anni Pickard presents to [facility] for follow-up of Fatigue (Pt had labs done yesterday, she thinks the fatigue is contributed to her thyroid. ), Toe Pain (Pt thinks she broke her right pinky toe. ), Back Pain (Pt had a lipoma removed 6-7 years ago, pretty constant pain in that area, the lipoma was removed by Dr. Queen. ), and Menstrual Problem (Pt states she thinks she is pre-barrett because she got her period and has been bleeding for 2 weeks. ).  Patient is also here for an annual physical    HPI    Here with   History of Present Illness  The patient is a 44-year-old female with a history of hypertension, diabetes, bipolar disorder, anxiety, and reflux, among others, here for a routine follow-up and lab review. Her main concerns are her toe pain and back pain, which is chronic. She had a lipoma removed by Dr. Ward in the past. She is accompanied by her .  Patient also here for her annual exam    She reports experiencing pain in her right fifth digit following an injury approximately 2 to 3 weeks ago, which she suspects may have resulted in a fracture. Despite the pain, she retains the ability to ambulate. She did not seek immediate medical attention at a hospital. The initial injury was characterized by swelling. She also mentions that her 200-pound [place] Reed frequently steps on her foot. She has since acquired new footwear and believes this should alleviate the issue.    She has been experiencing daily back pain for the past 7 years, which she attributes to potential nerve damage from a lipoma removal performed by Dr. Queen in 2019. She is not currently under his care. The pain is localized around the surgical scar and occasionally radiates. It is constant, with a severity rating ranging from 4 to 7 on a scale of 10. She describes the sensation as akin to a vibration or twisting. She is currently on Cymbalta for mood management and  gabapentin for sciatica, prescribed by Rosa Amaral at [facility], whom she sees monthly or bi-monthly. She reports that these medications provide some relief. She has been advised to consult with Dr. Queen again. She has not undergone any recent tests in the area of her back pain.    She has been experiencing bleeding for nearly 2 weeks. She typically menstruates every 4 months. She has an upcoming appointment with Dr. Wahl in April. She does not report experiencing hot flashes.    She has not had a mammogram recently, with her last one conducted 20 years ago. She has not received a tetanus vaccine. She continues to take Bentyl 4 times daily for bloating, a regimen initiated by her gastroenterologist at [place]. She reports that this medication is beneficial. She has a history of kidney stones but does not report any hematuria or symptoms suggestive of a urinary tract infection. She maintains regular dental check-ups and uses dentures. She is under the care of an ophthalmologist at [facility], [place].    She has a history of significant weight loss, having previously weighed nearly 300 pounds. She lost approximately 50 pounds over a period of 1.5 months due to emotional distress and decreased appetite. She is not currently on any antidiabetic medications.    MEDICATIONS  Current: Cymbalta, gabapentin, Bentyl (dicyclomine), Ambien      Records reviewed, meds reviewed in detail, labs reviewed with patient.  Plan of care is as follows below.    PMH-Bipolar-depression, hypothyroidism PTSD multiple pulmonary nodules , diabetes blindness of right eye nephrolithiasis, history of alcohol dependence anxiety depression GERD  Pulm nodules--seeing pulm -last CT chest 9/2024--stable     Sh- used to drink - smokes and trying to quit <1/2 PPD     PMFSH-Reviewed  ROS-toe pain, vag bleeding, chronic left flank pain      Current Outpatient Medications:     ALPRAZolam (XANAX) 1 MG tablet, Take 1 tablet by mouth 3 times a day. PRN, Disp:  ", Rfl:     dicyclomine (BENTYL) 10 MG capsule, Take 1 capsule by mouth 4 (Four) Times a Day., Disp: 120 capsule, Rfl: 5    DULoxetine (CYMBALTA) 20 MG capsule, Take 1 capsule by mouth Daily., Disp: , Rfl:     gabapentin (NEURONTIN) 300 MG capsule, Take 1 capsule by mouth 3 times a day., Disp: , Rfl:     ibuprofen (ADVIL,MOTRIN) 600 MG tablet, TAKE 1 TABLET BY MOUTH EVERY 8 HOURS FOR UP TO 7 DAYS AS NEEDED FOR MILD PAIN, Disp: , Rfl:     Multiple Vitamins-Minerals (Womens Multi) capsule, Take  by mouth., Disp: , Rfl:     pantoprazole (PROTONIX) 40 MG EC tablet, TAKE 1 TABLET BY MOUTH DAILY, Disp: 30 tablet, Rfl: 1    zolpidem CR (AMBIEN CR) 6.25 MG CR tablet, PRN, Disp: , Rfl:     atorvastatin (LIPITOR) 20 MG tablet, Take 1 tablet by mouth Daily., Disp: 90 tablet, Rfl: 1   PFSH reviewed.      OBJECTIVE  Vital Signs  Vitals:    04/02/25 1452 04/02/25 1536   BP: 146/98 128/90   BP Location: Left arm Left arm   Patient Position: Sitting Sitting   Cuff Size: Adult Adult   Pulse: 86    Temp: 98.4 °F (36.9 °C)    TempSrc: Temporal    SpO2: 96%    Weight: 63.7 kg (140 lb 6.4 oz)    Height: 139.7 cm (55\")       Body mass index is 32.63 kg/m².    Physical Exam  There is some wax in the left ear.    Vital Signs  The patient's weight is 140 pounds. Blood pressure is 128/90.  Lungs clear to auscultation heart regular rate and rhythm no edema lower extremities    Physical Exam  Vitals and nursing note reviewed.   Constitutional:       Appearance: Normal appearance.   HENT:      Head: Normocephalic and atraumatic.      Right Ear: Tympanic membrane normal.      Left Ear: There is impacted cerumen.   Cardiovascular:      Rate and Rhythm: Normal rate and regular rhythm.   Pulmonary:      Effort: Pulmonary effort is normal.      Breath sounds: Normal breath sounds.   Abdominal:      Palpations: Abdomen is soft.   Musculoskeletal:      Cervical back: Normal range of motion and neck supple.   Neurological:      General: No focal deficit " "present.      Mental Status: She is alert and oriented to person, place, and time.          RESULTS REVIEW  No results found for: \"PROBNP\", \"BNP\"  CMP          4/24/2024    08:11 5/1/2024    15:34 4/1/2025    13:12   CMP   Glucose 113  121  103    BUN 10  7  8    Creatinine 0.73  0.72  0.78    EGFR 104.1  105.9  96.2    Sodium 141  139  138    Potassium 3.1  3.6  3.3    Chloride 106  106  105    Calcium 9.4  9.6  10.1    Total Protein 6.6  6.7  6.8    Albumin 4.2  4.2  4.1    Globulin 2.4  2.5  2.7    Total Bilirubin 0.6  0.4  0.4    Alkaline Phosphatase 100  114  100    AST (SGOT) 19  28  18    ALT (SGPT) 42  28  10    Albumin/Globulin Ratio 1.8  1.7  1.5    BUN/Creatinine Ratio 13.7  9.7  10.3    Anion Gap 13.8  14.9  12.4      CBC w/diff          5/1/2024    15:34 5/10/2024    09:48 4/1/2025    13:12   CBC w/Diff   WBC 14.40  12.74     11.92    RBC 5.02  4.29     4.78    Hemoglobin 15.8  13.6     15.7    Hematocrit 44.0  39.7     45.6    MCV 87.6  92.5     95.4    MCH 31.5  31.7     32.8    MCHC 35.9  34.3     34.4    RDW 13.0  13.2     12.1    Platelets 470  404     438    Neutrophil Rel %  65.4     47.2    Immature Granulocyte Rel %  0.3     0.3    Lymphocyte Rel %  25.7     40.8    Monocyte Rel %  6.8     7.5    Eosinophil Rel %  1.0     2.9    Basophil Rel %  0.8     1.3       Details          This result is from an external source.              Lipid Panel          4/1/2025    13:12   Lipid Panel   Total Cholesterol 236    Triglycerides 130    HDL Cholesterol 36    VLDL Cholesterol 24    LDL Cholesterol  176    LDL/HDL Ratio 4.83       Lab Results   Component Value Date    TSH 0.864 04/01/2025    TSH 1.500 11/29/2023    TSH 1.230 11/25/2020      Lab Results   Component Value Date    FREET4 1.25 04/01/2025    FREET4 1.35 11/29/2023    FREET4 1.0 11/25/2020      A1C Last 3 Results          4/1/2025    13:12   HGBA1C Last 3 Results   Hemoglobin A1C 4.70       Lab Results   Component Value Date    OYWDZJGE92 " 1,636 (H) 04/01/2025    DBEM25XU 74.6 04/01/2025    MG 1.8 04/01/2025        No Images in the past 120 days found..        Results  Laboratory Studies  Potassium is 3.3. Blood sugar is 103. A1c is 4.7. Liver tests are good. Thyroid tests are good. B12 is over 1600. Vitamin D is 74. LDL cholesterol is 176.             ASSESSMENT & PLAN  Diagnoses and all orders for this visit:    1. Routine history and physical examination of adult (Primary)  Assessment & Plan:  Ages 40-64 Counseling/Anticipatory Guidance Discussed: nutrition, physical activity, healthy weight, injury prevention, misuse of tobacco, alcohol and drugs, sexual behavior and STDs, contraception, dental health, mental health, immunizations, screenings, self-breast exam, and use of seatbelt      2. Pure hypercholesterolemia  -     atorvastatin (LIPITOR) 20 MG tablet; Take 1 tablet by mouth Daily.  Dispense: 90 tablet; Refill: 1  -     Comprehensive Metabolic Panel; Future  -     Lipid Panel; Future  -     CBC & Differential; Future  -     Vitamin B12; Future  -     Folate; Future  -     Vitamin D,25-Hydroxy; Future  -     Magnesium; Future  -     Microalbumin / Creatinine Urine Ratio - Urine, Clean Catch; Future  -     Hemoglobin A1c; Future  -     TSH+Free T4; Future  -     T3, Free; Future    3. Essential hypertension  Assessment & Plan:  Blood pressure stable on no medications patient has lost 50 pounds over the past several years which has helped keep her blood pressure under control    Plan continue to follow salt diet no meds needed at this time  Goal blood pressures less than 130/80    Orders:  -     Comprehensive Metabolic Panel; Future  -     Lipid Panel; Future  -     CBC & Differential; Future  -     Vitamin B12; Future  -     Folate; Future  -     Vitamin D,25-Hydroxy; Future  -     Magnesium; Future  -     Microalbumin / Creatinine Urine Ratio - Urine, Clean Catch; Future  -     Hemoglobin A1c; Future  -     TSH+Free T4; Future  -     T3, Free;  Future    4. Vitamin D deficiency  -     Comprehensive Metabolic Panel; Future  -     Lipid Panel; Future  -     CBC & Differential; Future  -     Vitamin B12; Future  -     Folate; Future  -     Vitamin D,25-Hydroxy; Future  -     Magnesium; Future  -     Microalbumin / Creatinine Urine Ratio - Urine, Clean Catch; Future  -     Hemoglobin A1c; Future  -     TSH+Free T4; Future  -     T3, Free; Future    5. Bipolar affective disorder, remission status unspecified  -     Comprehensive Metabolic Panel; Future  -     Lipid Panel; Future  -     CBC & Differential; Future  -     Vitamin B12; Future  -     Folate; Future  -     Vitamin D,25-Hydroxy; Future  -     Magnesium; Future  -     Microalbumin / Creatinine Urine Ratio - Urine, Clean Catch; Future  -     Hemoglobin A1c; Future  -     TSH+Free T4; Future  -     T3, Free; Future    6. Injury of toe on right foot, initial encounter  -     XR Foot 3+ View Right; Future  -     Comprehensive Metabolic Panel; Future  -     Lipid Panel; Future  -     CBC & Differential; Future  -     Vitamin B12; Future  -     Folate; Future  -     Vitamin D,25-Hydroxy; Future  -     Magnesium; Future  -     Microalbumin / Creatinine Urine Ratio - Urine, Clean Catch; Future  -     Hemoglobin A1c; Future  -     TSH+Free T4; Future  -     T3, Free; Future    7. Left flank pain, chronic  -     Ambulatory Referral to General Surgery  -     Comprehensive Metabolic Panel; Future  -     Lipid Panel; Future  -     CBC & Differential; Future  -     Vitamin B12; Future  -     Folate; Future  -     Vitamin D,25-Hydroxy; Future  -     Magnesium; Future  -     Microalbumin / Creatinine Urine Ratio - Urine, Clean Catch; Future  -     Hemoglobin A1c; Future  -     TSH+Free T4; Future  -     T3, Free; Future    8. Vaginal bleeding  -     Follicle stimulating hormone; Future  -     Luteinizing hormone; Future  -     Comprehensive Metabolic Panel; Future  -     Lipid Panel; Future  -     CBC & Differential;  Future  -     Vitamin B12; Future  -     Folate; Future  -     Vitamin D,25-Hydroxy; Future  -     Magnesium; Future  -     Microalbumin / Creatinine Urine Ratio - Urine, Clean Catch; Future  -     Hemoglobin A1c; Future  -     TSH+Free T4; Future  -     T3, Free; Future    9. Need for hepatitis C screening test  -     Hepatitis C antibody; Future  -     Comprehensive Metabolic Panel; Future  -     Lipid Panel; Future  -     CBC & Differential; Future  -     Vitamin B12; Future  -     Folate; Future  -     Vitamin D,25-Hydroxy; Future  -     Magnesium; Future  -     Microalbumin / Creatinine Urine Ratio - Urine, Clean Catch; Future  -     Hemoglobin A1c; Future  -     TSH+Free T4; Future  -     T3, Free; Future    10. Encounter for screening breast examination  -     Mammo Diagnostic Digital Tomosynthesis Bilateral With CAD; Future  -     Comprehensive Metabolic Panel; Future  -     Lipid Panel; Future  -     CBC & Differential; Future  -     Vitamin B12; Future  -     Folate; Future  -     Vitamin D,25-Hydroxy; Future  -     Magnesium; Future  -     Microalbumin / Creatinine Urine Ratio - Urine, Clean Catch; Future  -     Hemoglobin A1c; Future  -     TSH+Free T4; Future  -     T3, Free; Future    11. Impacted cerumen of left ear  Comments:  Use Debrox eardrops to left ear and then return to clinic for irrigation in 7 to 10 days  Orders:  -     Comprehensive Metabolic Panel; Future  -     Lipid Panel; Future  -     CBC & Differential; Future  -     Vitamin B12; Future  -     Folate; Future  -     Vitamin D,25-Hydroxy; Future  -     Magnesium; Future  -     Microalbumin / Creatinine Urine Ratio - Urine, Clean Catch; Future  -     Hemoglobin A1c; Future  -     TSH+Free T4; Future  -     T3, Free; Future    12. Legally blind in right eye, as defined in [place]    Other orders  -     Tdap Vaccine => 8yo IM (BOOSTRIX/ADACEL)  -     dicyclomine (BENTYL) 10 MG capsule; Take 1 capsule by mouth 4 (Four) Times a Day.  Dispense:  120 capsule; Refill: 5           Assessment & Plan  1. Right toe pain.  An x-ray of the right foot will be ordered to assess for any fractures. If a fracture is identified, ellen taping will be recommended for 4 to 8 weeks. She is advised to avoid further injury to the toe and to use Tylenol for pain management if necessary.    2. Chronic back pain.  The pain appears to be localized more towards the flank area rather than the bone. Previous imaging studies have not revealed any significant findings in this region. A referral to Dr. Diego will be initiated for further evaluation. An ultrasound of the affected area may be considered to rule out any residual issues or adhesions.  Patient currently with vaginal bleeding UA will not be helpful also patient with no dysuria or urinary frequency.  This is chronic patient does not feel needs further evaluation at this time if worsens will return to clinic.      3. Vaginal bleeding.  She is not anemic and does not report significant blood loss. She is advised to maintain her scheduled appointment with Dr. Wahl in April 2025. Additional blood tests will be ordered to assess for potential menopause.    4. Health maintenance.  She is advised to reduce her multivitamin intake to 3 days per week. A mammogram will be scheduled. She will receive a tetanus vaccine today and is encouraged to receive an influenza vaccine annually in the fall. A urinalysis will be conducted to rule out any hematuria.    5. Hyperlipidemia.  Her LDL cholesterol level is elevated at 176 mg/dL. Lipitor 20 mg once daily will be initiated, and she is advised to monitor for any muscle cramps.    Follow-up  The patient will follow up in 5 months.    PROCEDURE  The patient had a lipoma removed by Dr. Ward in 2018.              Patient Instructions   Refer back to Dr Bret Field foot  Schedule  mamm  F/u with Dr Wahl  F/u at [facility] 1x/month  Decrease MVI MWF     Patient or patient representative  verbalized consent for the use of Ambient Listening during the visit with  Evelyn Call MD for chart documentation. 4/2/2025  15:58 EDT    FOLLOW UP  Return in about 5 months (around 9/2/2025) for Recheck.  Do labs    Patient was given instructions and counseling regarding her condition or for health maintenance advice. Please see specific information pulled into the AVS if appropriate.

## 2025-04-03 DIAGNOSIS — N28.9 RENAL LESION: Primary | ICD-10-CM

## 2025-04-11 ENCOUNTER — TELEPHONE (OUTPATIENT)
Dept: INTERNAL MEDICINE | Age: 45
End: 2025-04-11
Payer: OTHER GOVERNMENT

## 2025-04-11 DIAGNOSIS — Z12.31 SCREENING MAMMOGRAM FOR BREAST CANCER: Primary | ICD-10-CM

## 2025-04-16 ENCOUNTER — TELEPHONE (OUTPATIENT)
Dept: INTERNAL MEDICINE | Age: 45
End: 2025-04-16
Payer: OTHER GOVERNMENT

## 2025-04-16 DIAGNOSIS — R10.9 CHRONIC LEFT FLANK PAIN: Primary | ICD-10-CM

## 2025-04-16 DIAGNOSIS — G89.29 CHRONIC LEFT FLANK PAIN: Primary | ICD-10-CM

## 2025-04-23 ENCOUNTER — TELEPHONE (OUTPATIENT)
Dept: UROLOGY | Age: 45
End: 2025-04-23
Payer: OTHER GOVERNMENT

## 2025-04-28 NOTE — TELEPHONE ENCOUNTER
3RD CALL TO PT TO RS APPT FROM 6/9/25 W/ROBISON/LMOM/PROVIDER WILL BE OUT AT THAT TIME/OFFICE CX APPT

## 2025-04-30 DIAGNOSIS — K21.9 GASTROESOPHAGEAL REFLUX DISEASE WITHOUT ESOPHAGITIS: ICD-10-CM

## 2025-04-30 RX ORDER — PANTOPRAZOLE SODIUM 40 MG/1
40 TABLET, DELAYED RELEASE ORAL DAILY
Qty: 30 TABLET | Refills: 1 | Status: SHIPPED | OUTPATIENT
Start: 2025-04-30

## 2025-06-24 ENCOUNTER — TELEPHONE (OUTPATIENT)
Dept: UROLOGY | Age: 45
End: 2025-06-24
Payer: OTHER GOVERNMENT

## 2025-06-24 ENCOUNTER — TELEPHONE (OUTPATIENT)
Dept: INTERNAL MEDICINE | Age: 45
End: 2025-06-24

## 2025-06-24 DIAGNOSIS — N28.9 RENAL LESION: Primary | ICD-10-CM

## 2025-06-24 NOTE — TELEPHONE ENCOUNTER
Patient has already rescheduled her late cancelled appt.      Policy letter being sent.       Gretchen - Alex Rep.

## 2025-06-24 NOTE — TELEPHONE ENCOUNTER
CELINE CALLED FROM  SCHEDULING.  PATIENT IS SCHEDULED FOR AN MRI ABDOMEN W/WO CONTRAST ON 06/25/25 AT 1:00.  SHE NEEDS TO MAKE SURE ALL THE INFORMATION IS IN FOR APPOINTMENT, BUT SAID IT SHOWS THE REFERRAL IS CANCELED, AND SHE CAN'T SEE EVERYTHING.    SHE SAID THEY HAD HER SCHEDULED ON 05/14/25 AND SHE DIDN'T COME.  THEY GOT HER RESCHEDULED THROUGH HER  ON 05/22/25 FOR THE APPOINTMENT TOMORROW.    SHE NEEDS TO KNOW WHAT IS GOING ON.      IF SHE DOESN'T HEAR BACK BY 3:00 TODAY, SHE WILL PROBABLY HAVE TO CANCEL THE APPOINTMENT.    ISABEL AND WHITLEY THINK IT IS THE ORDER THAT WAS CANCELED.    #[phone]

## 2025-07-10 DIAGNOSIS — E78.00 PURE HYPERCHOLESTEROLEMIA: ICD-10-CM

## 2025-07-10 RX ORDER — ATORVASTATIN CALCIUM 20 MG/1
20 TABLET, FILM COATED ORAL DAILY
Qty: 90 TABLET | Refills: 1 | Status: SHIPPED | OUTPATIENT
Start: 2025-07-10

## 2025-07-14 ENCOUNTER — TELEPHONE (OUTPATIENT)
Dept: INTERNAL MEDICINE | Age: 45
End: 2025-07-14